# Patient Record
Sex: FEMALE | Race: BLACK OR AFRICAN AMERICAN | NOT HISPANIC OR LATINO | Employment: UNEMPLOYED | URBAN - METROPOLITAN AREA
[De-identification: names, ages, dates, MRNs, and addresses within clinical notes are randomized per-mention and may not be internally consistent; named-entity substitution may affect disease eponyms.]

---

## 2022-01-12 ENCOUNTER — OFFICE VISIT (OUTPATIENT)
Dept: URGENT CARE | Facility: CLINIC | Age: 27
End: 2022-01-12
Payer: COMMERCIAL

## 2022-01-12 VITALS
OXYGEN SATURATION: 100 % | DIASTOLIC BLOOD PRESSURE: 87 MMHG | RESPIRATION RATE: 16 BRPM | TEMPERATURE: 97 F | SYSTOLIC BLOOD PRESSURE: 128 MMHG | HEART RATE: 84 BPM

## 2022-01-12 DIAGNOSIS — R10.9 ABDOMINAL PAIN, UNSPECIFIED ABDOMINAL LOCATION: ICD-10-CM

## 2022-01-12 DIAGNOSIS — Z32.01 POSITIVE PREGNANCY TEST: Primary | ICD-10-CM

## 2022-01-12 DIAGNOSIS — Z76.89 ENCOUNTER TO ESTABLISH CARE: ICD-10-CM

## 2022-01-12 LAB
B-HCG UR QL: POSITIVE
BILIRUB UR QL STRIP: NEGATIVE
CTP QC/QA: YES
CTP QC/QA: YES
GLUCOSE UR QL STRIP: NEGATIVE
KETONES UR QL STRIP: NEGATIVE
LEUKOCYTE ESTERASE UR QL STRIP: NEGATIVE
PH, POC UA: 6.5 (ref 5–8)
POC BLOOD, URINE: NEGATIVE
POC NITRATES, URINE: NEGATIVE
PROT UR QL STRIP: NEGATIVE
SARS-COV-2 RDRP RESP QL NAA+PROBE: NEGATIVE
SP GR UR STRIP: 1.02 (ref 1–1.03)
UROBILINOGEN UR STRIP-ACNC: NORMAL (ref 0.1–1.1)

## 2022-01-12 PROCEDURE — 1159F MED LIST DOCD IN RCRD: CPT | Mod: CPTII,S$GLB,,

## 2022-01-12 PROCEDURE — 81025 URINE PREGNANCY TEST: CPT | Mod: S$GLB,,,

## 2022-01-12 PROCEDURE — 3079F PR MOST RECENT DIASTOLIC BLOOD PRESSURE 80-89 MM HG: ICD-10-PCS | Mod: CPTII,S$GLB,,

## 2022-01-12 PROCEDURE — 3074F PR MOST RECENT SYSTOLIC BLOOD PRESSURE < 130 MM HG: ICD-10-PCS | Mod: CPTII,S$GLB,,

## 2022-01-12 PROCEDURE — 81025 POCT URINE PREGNANCY: ICD-10-PCS | Mod: S$GLB,,,

## 2022-01-12 PROCEDURE — 3074F SYST BP LT 130 MM HG: CPT | Mod: CPTII,S$GLB,,

## 2022-01-12 PROCEDURE — 99213 PR OFFICE/OUTPT VISIT, EST, LEVL III, 20-29 MIN: ICD-10-PCS | Mod: 25,S$GLB,,

## 2022-01-12 PROCEDURE — 81003 URINALYSIS AUTO W/O SCOPE: CPT | Mod: QW,S$GLB,,

## 2022-01-12 PROCEDURE — 1159F PR MEDICATION LIST DOCUMENTED IN MEDICAL RECORD: ICD-10-PCS | Mod: CPTII,S$GLB,,

## 2022-01-12 PROCEDURE — U0002: ICD-10-PCS | Mod: QW,S$GLB,,

## 2022-01-12 PROCEDURE — 3079F DIAST BP 80-89 MM HG: CPT | Mod: CPTII,S$GLB,,

## 2022-01-12 PROCEDURE — 99213 OFFICE O/P EST LOW 20 MIN: CPT | Mod: 25,S$GLB,,

## 2022-01-12 PROCEDURE — 1160F PR REVIEW ALL MEDS BY PRESCRIBER/CLIN PHARMACIST DOCUMENTED: ICD-10-PCS | Mod: CPTII,S$GLB,,

## 2022-01-12 PROCEDURE — 81003 POCT URINALYSIS, DIPSTICK, AUTOMATED, W/O SCOPE: ICD-10-PCS | Mod: QW,S$GLB,,

## 2022-01-12 PROCEDURE — 1160F RVW MEDS BY RX/DR IN RCRD: CPT | Mod: CPTII,S$GLB,,

## 2022-01-12 PROCEDURE — U0002 COVID-19 LAB TEST NON-CDC: HCPCS | Mod: QW,S$GLB,,

## 2022-01-12 NOTE — PATIENT INSTRUCTIONS
- Rest.    - Drink plenty of fluids.    - Acetaminophen (tylenol) as directed as needed for pain. Avoid tylenol if you have a history of liver disease.  -Take prenatal vitamin as prescribed  Please follow-up with specialist regarding your positive pregnancy test today.  Please start your prenatal vitamin and continue taking Tylenol as needed for pain.  If you start developing worsening abdominal pain, nausea, vomiting, vaginal bleeding, please go to the emergency room immediately.    General Referral to [facility]   You were referred to [facility] for follow-up care on your condition.    Please call [phone] to schedule your appointment.    Please go to the Emergency Department for any concerns or worsening of condition.  Please follow up with your primary care doctor or specialist in the next 48-72hrs as needed.    If you  smoke, please stop smoking.    - You must understand that you have received an Urgent Care treatment only and that you may be released before all of your medical problems are known or treated.   - You, the patient, will arrange for follow up care as instructed.   - If your condition worsens or fails to improve we recommend that you receive another evaluation at the ER immediately or contact your PCP to discuss your concerns or return here.   - Follow up with your PCP or specialty clinic as directed in the next 1-2 weeks if not improved or as needed.  You can call [phone] to schedule an appointment with the appropriate provider.      Patient Education       Ectopic Pregnancy   The Basics   Written by the doctors and editors at [facility]   What is an ectopic pregnancy? -- This is the term doctors use for a pregnancy that is not located in the uterus, where a pregnancy usually is. It is located somewhere else in the body. An ectopic pregnancy is a serious condition and can even be life-threatening.  Pregnancy begins when an egg joins together with a sperm. These cells grow  "into a bigger group of cells, called an "embryo." In a normal pregnancy, the embryo attaches to the lining of the uterus (figure 1). Then, it can grow into a baby (figure 2).  In an ectopic pregnancy, the embryo does not attach to the lining of the uterus. Instead, it attaches to a place in the body that it should not attach to and starts to grow. Even though the embryo gets bigger, it cannot grow into a baby. As the embryo gets bigger, it can cause pain and bleeding and lead to other problems. Some of these problems can be life-threatening.  In most ectopic pregnancies, the embryo attaches to the lining of 1 of the fallopian tubes (the tubes that connect the ovaries to the uterus). When this happens, doctors also call it a "tubal pregnancy." In rare cases, the embryo can also attach to the cervix, ovary, or the inside of the belly.  Some people have a higher chance than others of having an ectopic pregnancy. You can have a higher chance of having an ectopic pregnancy if you:  · Have abnormal or damaged fallopian tubes, such as from past infections or surgeries  · Have had an ectopic pregnancy before  · Are getting certain treatments to help you get pregnant  · Smoke cigarettes  People who use a type of birth control called an "intrauterine device" or "IUD" have a very low chance of getting pregnant at all. But, if a person using an IUD does get pregnant, they are at higher risk of ectopic pregnancy. If you have an IUD and you think you are pregnant, tell your doctor or nurse right away. They can check for ectopic pregnancy.  What are the symptoms of an ectopic pregnancy? -- People who have an ectopic pregnancy don't always have symptoms early on. When early symptoms do happen, they can include:  · Lower belly pain  · Bleeding from the vagina (the bleeding can be heavy or light, or it can even be just spots of blood or brown staining)  Some people don't have any symptoms until the ectopic pregnancy causes more " "serious problems. For example, an embryo growing in a fallopian tube can cause the fallopian tube to burst open or "rupture." When this happens, symptoms can include:  · Severe lower belly pain  · Heavy bleeding from the vagina  · Fainting or passing out, or feeling like you might faint or pass out  If you are pregnant and have any of the symptoms listed above, go to an emergency room as soon as possible.  Is there a test for an ectopic pregnancy? -- To check if you have an ectopic pregnancy, your doctor or nurse can do:  · A blood test to measure a hormone called hCG - This test checks if you are pregnant and how much hCG the pregnancy is making.  · An imaging test called an ultrasound to see where the embryo is in your body - Imaging tests create pictures of the inside of the body.  Sometimes, test results show an ectopic pregnancy right away. But most of the time, doctors need to repeat the tests every few days to know for sure if you have an ectopic pregnancy.  How is an ectopic pregnancy treated? -- Doctors can treat an ectopic pregnancy in 2 ways, depending on the size of the embryo, your symptoms, and other factors. Both treatments involve removing the embryo. It is important to do this, because if the embryo continues to grow, it could cause the fallopian tube to burst (rupture). If this happens, it can be life threatening.  Options for treatment include:  · Medicine - Your doctor can give you a shot of a medicine called methotrexate. This medicine stops the embryo's growth and causes it to dissolve. If you are treated with medicine, you will need follow-up blood tests for a few weeks to make sure that the treatment worked.  · Surgery - A doctor can do surgery to remove the embryo. Your doctor might or might not need to remove your fallopian tube, too.  Can an ectopic pregnancy be prevented? -- Most ectopic pregnancies cannot be prevented. If you are planning to try to get pregnant, there is nothing specific " "you can do to prevent ectopic pregnancy.  The chance of having an ectopic pregnancy is higher in people who have had certain infections that are spread through sex ("sexually transmitted infections").  Can I get pregnant in the future? -- Most people are able to have a normal pregnancy after having an ectopic pregnancy. But let your doctor or nurse know if you are trying to get pregnant. That way, they can follow your pregnancy to make sure everything is normal.  All topics are updated as new evidence becomes available and our peer review process is complete.  This topic retrieved from [employer] on: Sep 21, 2021.  Topic 00285 Version 10.0  Release: 29.4.2 - C29.263  © 2021 UpToDate, Inc. and/or its affiliates. All rights reserved.  figure 1: Female reproductive anatomy     These are the internal organs that make up the female reproductive system.  Graphic 41711 Version 6.0    figure 2: How pregnancy happens     When pregnancy happens through sex, the following steps must happen:  1. An egg is released from the ovary. This is called "ovulation."  2. The partner's sperm swim up the vagina, into the uterus, and up the fallopian tubes. (These are the tubes that connect the ovaries to the uterus.)  3. When the sperm reach the egg, at least one sperm must get through the outer casing of the egg and make it inside. This is called "fertilization."   4. The newly fertilized egg travels down to the uterus.   5. The egg secures itself to the wall of the uterus. This is called "implantation."  Graphic 33284 Version 5.0    Consumer Information Use and Disclaimer   This information is not specific medical advice and does not replace information you receive from your health care provider. This is only a brief summary of general information. It does NOT include all information about conditions, illnesses, injuries, tests, procedures, treatments, therapies, discharge instructions or life-style choices that may apply to you. You must " talk with your health care provider for complete information about your health and treatment options. This information should not be used to decide whether or not to accept your health care provider's advice, instructions or recommendations. Only your health care provider has the knowledge and training to provide advice that is right for you. The use of this information is governed by the [employer] End User License Agreement, available at https://www.Eldarion/en/solutions/SocialVolt/about/nahomy.The use of [employer] content is governed by the [employer] Terms of Use. ©2021 UpToDate, Inc. All rights reserved.  Copyright   © 2021 UpToDate, Inc. and/or its affiliates. All rights reserved.

## 2022-01-12 NOTE — PROGRESS NOTES
Subjective:       Patient ID: Karlene Sweeney is a 26 y.o. female.    Vitals:  temperature is 97.4 °F (36.3 °C). Her blood pressure is 128/87 and her pulse is 84. Her respiration is 16 and oxygen saturation is 100%.     Chief Complaint: Sinus Problem and Abdominal Pain    Pt presents today with a chief complaint of lower abdominal pain. Pt mentions her symptoms started 3 day ago and have gradually gotten worse. Pt mentions taking tylenol to help alleviate her symptoms.     Provider note starts below:  Patient presents with lower bilateral abdominal pain that she states started 3 days ago.  She 1st noticed it coming on gradually and says it comes and goes.  She describes it as an aching pain that ranges from 4 to 5 out of 10 in severity.  She cannot think of anything that makes it worse or better.  It is not worsened with food or with physician.  She denies any changes in discharge, vaginal odor, dysuria, frequency, back pain, fever, chills.  Her last sexual intercourse was approximately 3 weeks ago with her boyfriend.  She is not currently on birth control.  She discontinued her birth control approximately 1 month ago.  Her last menstrual period was approximately 1 month ago around 11th or 13th of December.  She states she does not have a history of STD.  No past surgeries she reports. She state she has history of three miscarriaiges in the past. Of note, she states she has been constipated and has not had a normal bowel movement in several days.  She denies any nausea or vomiting.  She has been taking Tylenol for her pain.    Sinus Problem  This is a new problem. The current episode started in the past 7 days. The problem has been gradually worsening since onset. There has been no fever. Her pain is at a severity of 2/10. The pain is mild. Pertinent negatives include no chills, congestion, coughing, diaphoresis, ear pain, headaches, hoarse voice, neck pain, shortness of breath, sinus pressure, sneezing, sore throat or  swollen glands. Past treatments include acetaminophen. The treatment provided no relief.   Abdominal Pain  Associated symptoms include constipation. Pertinent negatives include no diarrhea, dysuria, fever, frequency, headaches, hematuria, nausea or vomiting. There is no history of abdominal surgery.       Constitution: Negative for chills, sweating and fever.   HENT: Negative for ear pain, congestion, sinus pressure and sore throat.    Neck: Negative for neck pain.   Cardiovascular: Negative for chest pain.   Respiratory: Negative for cough and shortness of breath.    Gastrointestinal: Positive for abdominal pain and constipation. Negative for abdominal trauma, history of abdominal surgery, nausea, vomiting and diarrhea.   Genitourinary: Negative for dysuria, frequency, urgency, flank pain, hematuria, vaginal pain, vaginal discharge, vaginal bleeding and vaginal odor.   Musculoskeletal: Negative for trauma.   Allergic/Immunologic: Negative for sneezing.   Neurological: Negative for headaches.       Objective:      Physical Exam   Constitutional: She is oriented to person, place, and time. She appears well-developed and well-nourished.   HENT:   Head: Normocephalic and atraumatic.   Ears:   Right Ear: External ear normal.   Left Ear: External ear normal.   Nose: Nose normal.   Mouth/Throat: Mucous membranes are normal.   Eyes: Conjunctivae and lids are normal.   Neck: Trachea normal. Neck supple.   Cardiovascular: Normal rate, regular rhythm, S1 normal, S2 normal, normal heart sounds and normal pulses.   Pulmonary/Chest: Effort normal and breath sounds normal. No respiratory distress. She has no wheezes. She has no rales.   Abdominal: Normal appearance and bowel sounds are normal. She exhibits no distension, no abdominal bruit, no pulsatile midline mass and no mass. Soft. There is abdominal tenderness in the suprapubic area, left upper quadrant and left lower quadrant. There is no rebound, no guarding, no tenderness  at McBurney's point, negative Sheehan's sign, no left CVA tenderness, negative Rovsing's sign and no right CVA tenderness.   Musculoskeletal: Normal range of motion.         General: No edema. Normal range of motion.   Neurological: She is alert and oriented to person, place, and time. She has normal strength.   Skin: Skin is warm, dry, intact, not diaphoretic and not pale.   Psychiatric: She has a normal mood and affect. Her speech is normal and behavior is normal. Judgment and thought content normal. Cognition and memory  Nursing note and vitals reviewed.    Results for orders placed or performed in visit on 22   POCT COVID-19 Rapid Screening   Result Value Ref Range    POC Rapid COVID Negative Negative     Acceptable Yes    POCT Urinalysis, Dipstick, Automated, W/O Scope   Result Value Ref Range    POC Blood, Urine Negative Negative    POC Bilirubin, Urine Negative Negative    POC Urobilinogen, Urine NORMAL 0.1 - 1.1    POC Ketones, Urine Negative Negative    POC Protein, Urine Negative Negative    POC Nitrates, Urine Negative Negative    POC Glucose, Urine Negative Negative    pH, UA 6.5 5 - 8    POC Specific Gravity, Urine 1.020 1.003 - 1.029    POC Leukocytes, Urine Negative Negative   POCT urine pregnancy   Result Value Ref Range    POC Preg Test, Ur Positive (A) Negative     Acceptable Yes            Assessment:       1. Positive pregnancy test    2. Abdominal pain, unspecified abdominal location    3. Encounter to establish care          Plan:     Labs reviewed.  Discussed over-the-counter treatment for symptom control as discussed below.  Discussed positive pregnancy results with patient.  Informed patient that pain could be due to uterine ligament stretching due to recent pregnancy or possible ectopic pregnancy or spontaneous .  Informed patient that above could not be ruled out as we do not have appropriate imaging or blood work.  Risks and worsening symptoms  associated with above discussed with patient.  Shared decision making with patient regarding if she should go to the emergency room or not.  Patient states that she wanted to follow-up with OBGYN and would go to the emergency room if her symptoms worsened.  Patient agreed to this treatment plan.  Patient discharged in stable condition.  Case discussed with Dr. Adrien Trevino who agreed with plan.    Positive pregnancy test  -     prenatal vit-iron fum-folic ac 27 mg iron- 0.8 mg Tab; Take 1 tablet by mouth once daily.  Dispense: 30 tablet; Refill: 11  -     Ambulatory referral/consult to Obstetrics / Gynecology    Abdominal pain, unspecified abdominal location  -     POCT COVID-19 Rapid Screening  -     POCT Urinalysis, Dipstick, Automated, W/O Scope  -     POCT urine pregnancy  -     Ambulatory referral/consult to Obstetrics / Gynecology    Encounter to establish care  -     Ambulatory referral/consult to Obstetrics / Gynecology    Patient Instructions   - Rest.    - Drink plenty of fluids.    - Acetaminophen (tylenol) as directed as needed for pain. Avoid tylenol if you have a history of liver disease.  -Take prenatal vitamin as prescribed  Please follow-up with specialist regarding your positive pregnancy test today.  Please start your prenatal vitamin and continue taking Tylenol as needed for pain.  If you start developing worsening abdominal pain, nausea, vomiting, vaginal bleeding, please go to the emergency room immediately.    General Referral to [facility]   You were referred to [facility] for follow-up care on your condition.    Please call [phone] to schedule your appointment.    Please go to the Emergency Department for any concerns or worsening of condition.  Please follow up with your primary care doctor or specialist in the next 48-72hrs as needed.    If you  smoke, please stop smoking.    - You must understand that you have received an Urgent Care treatment only and that you may be  "released before all of your medical problems are known or treated.   - You, the patient, will arrange for follow up care as instructed.   - If your condition worsens or fails to improve we recommend that you receive another evaluation at the ER immediately or contact your PCP to discuss your concerns or return here.   - Follow up with your PCP or specialty clinic as directed in the next 1-2 weeks if not improved or as needed.  You can call [phone] to schedule an appointment with the appropriate provider.      Patient Education       Ectopic Pregnancy   The Basics   Written by the doctors and editors at [facility]   What is an ectopic pregnancy? -- This is the term doctors use for a pregnancy that is not located in the uterus, where a pregnancy usually is. It is located somewhere else in the body. An ectopic pregnancy is a serious condition and can even be life-threatening.  Pregnancy begins when an egg joins together with a sperm. These cells grow into a bigger group of cells, called an "embryo." In a normal pregnancy, the embryo attaches to the lining of the uterus (figure 1). Then, it can grow into a baby (figure 2).  In an ectopic pregnancy, the embryo does not attach to the lining of the uterus. Instead, it attaches to a place in the body that it should not attach to and starts to grow. Even though the embryo gets bigger, it cannot grow into a baby. As the embryo gets bigger, it can cause pain and bleeding and lead to other problems. Some of these problems can be life-threatening.  In most ectopic pregnancies, the embryo attaches to the lining of 1 of the fallopian tubes (the tubes that connect the ovaries to the uterus). When this happens, doctors also call it a "tubal pregnancy." In rare cases, the embryo can also attach to the cervix, ovary, or the inside of the belly.  Some people have a higher chance than others of having an ectopic pregnancy. You can have a higher chance of having an ectopic pregnancy " "if you:  · Have abnormal or damaged fallopian tubes, such as from past infections or surgeries  · Have had an ectopic pregnancy before  · Are getting certain treatments to help you get pregnant  · Smoke cigarettes  People who use a type of birth control called an "intrauterine device" or "IUD" have a very low chance of getting pregnant at all. But, if a person using an IUD does get pregnant, they are at higher risk of ectopic pregnancy. If you have an IUD and you think you are pregnant, tell your doctor or nurse right away. They can check for ectopic pregnancy.  What are the symptoms of an ectopic pregnancy? -- People who have an ectopic pregnancy don't always have symptoms early on. When early symptoms do happen, they can include:  · Lower belly pain  · Bleeding from the vagina (the bleeding can be heavy or light, or it can even be just spots of blood or brown staining)  Some people don't have any symptoms until the ectopic pregnancy causes more serious problems. For example, an embryo growing in a fallopian tube can cause the fallopian tube to burst open or "rupture." When this happens, symptoms can include:  · Severe lower belly pain  · Heavy bleeding from the vagina  · Fainting or passing out, or feeling like you might faint or pass out  If you are pregnant and have any of the symptoms listed above, go to an emergency room as soon as possible.  Is there a test for an ectopic pregnancy? -- To check if you have an ectopic pregnancy, your doctor or nurse can do:  · A blood test to measure a hormone called hCG - This test checks if you are pregnant and how much hCG the pregnancy is making.  · An imaging test called an ultrasound to see where the embryo is in your body - Imaging tests create pictures of the inside of the body.  Sometimes, test results show an ectopic pregnancy right away. But most of the time, doctors need to repeat the tests every few days to know for sure if you have an ectopic pregnancy.  How is " "an ectopic pregnancy treated? -- Doctors can treat an ectopic pregnancy in 2 ways, depending on the size of the embryo, your symptoms, and other factors. Both treatments involve removing the embryo. It is important to do this, because if the embryo continues to grow, it could cause the fallopian tube to burst (rupture). If this happens, it can be life threatening.  Options for treatment include:  · Medicine - Your doctor can give you a shot of a medicine called methotrexate. This medicine stops the embryo's growth and causes it to dissolve. If you are treated with medicine, you will need follow-up blood tests for a few weeks to make sure that the treatment worked.  · Surgery - A doctor can do surgery to remove the embryo. Your doctor might or might not need to remove your fallopian tube, too.  Can an ectopic pregnancy be prevented? -- Most ectopic pregnancies cannot be prevented. If you are planning to try to get pregnant, there is nothing specific you can do to prevent ectopic pregnancy.  The chance of having an ectopic pregnancy is higher in people who have had certain infections that are spread through sex ("sexually transmitted infections").  Can I get pregnant in the future? -- Most people are able to have a normal pregnancy after having an ectopic pregnancy. But let your doctor or nurse know if you are trying to get pregnant. That way, they can follow your pregnancy to make sure everything is normal.  All topics are updated as new evidence becomes available and our peer review process is complete.  This topic retrieved from [employer] on: Sep 21, 2021.  Topic 84490 Version 10.0  Release: 29.4.2 - C29.263  © 2021 UpToDate, Inc. and/or its affiliates. All rights reserved.  figure 1: Female reproductive anatomy     These are the internal organs that make up the female reproductive system.  Graphic 38419 Version 6.0    figure 2: How pregnancy happens     When pregnancy happens through sex, the following steps " "must happen:  1. An egg is released from the ovary. This is called "ovulation."  2. The partner's sperm swim up the vagina, into the uterus, and up the fallopian tubes. (These are the tubes that connect the ovaries to the uterus.)  3. When the sperm reach the egg, at least one sperm must get through the outer casing of the egg and make it inside. This is called "fertilization."   4. The newly fertilized egg travels down to the uterus.   5. The egg secures itself to the wall of the uterus. This is called "implantation."  Graphic 07009 Version 5.0    Consumer Information Use and Disclaimer   This information is not specific medical advice and does not replace information you receive from your health care provider. This is only a brief summary of general information. It does NOT include all information about conditions, illnesses, injuries, tests, procedures, treatments, therapies, discharge instructions or life-style choices that may apply to you. You must talk with your health care provider for complete information about your health and treatment options. This information should not be used to decide whether or not to accept your health care provider's advice, instructions or recommendations. Only your health care provider has the knowledge and training to provide advice that is right for you. The use of this information is governed by the [employer] End User License Agreement, available at https://[email or web address].Surveypal/en/solutions/OneWheel/about/nahomy.The use of [employer] content is governed by the [employer] Terms of Use. ©2021 UpToDate, Inc. All rights reserved.  Copyright   © 2021 UpToDate, Inc. and/or its affiliates. All rights reserved.                     "

## 2022-01-13 ENCOUNTER — OFFICE VISIT (OUTPATIENT)
Dept: OBSTETRICS AND GYNECOLOGY | Facility: CLINIC | Age: 27
End: 2022-01-13
Payer: COMMERCIAL

## 2022-01-13 ENCOUNTER — LAB VISIT (OUTPATIENT)
Dept: LAB | Facility: HOSPITAL | Age: 27
End: 2022-01-13
Attending: OBSTETRICS & GYNECOLOGY
Payer: COMMERCIAL

## 2022-01-13 VITALS
HEIGHT: 62 IN | DIASTOLIC BLOOD PRESSURE: 90 MMHG | WEIGHT: 128.06 LBS | SYSTOLIC BLOOD PRESSURE: 130 MMHG | BODY MASS INDEX: 23.57 KG/M2

## 2022-01-13 DIAGNOSIS — N91.2 AMENORRHEA: ICD-10-CM

## 2022-01-13 DIAGNOSIS — N91.2 AMENORRHEA: Primary | ICD-10-CM

## 2022-01-13 LAB — HCG INTACT+B SERPL-ACNC: 104 MIU/ML

## 2022-01-13 PROCEDURE — 3075F SYST BP GE 130 - 139MM HG: CPT | Mod: CPTII,S$GLB,, | Performed by: OBSTETRICS & GYNECOLOGY

## 2022-01-13 PROCEDURE — 3008F PR BODY MASS INDEX (BMI) DOCUMENTED: ICD-10-PCS | Mod: CPTII,S$GLB,, | Performed by: OBSTETRICS & GYNECOLOGY

## 2022-01-13 PROCEDURE — 1160F PR REVIEW ALL MEDS BY PRESCRIBER/CLIN PHARMACIST DOCUMENTED: ICD-10-PCS | Mod: CPTII,S$GLB,, | Performed by: OBSTETRICS & GYNECOLOGY

## 2022-01-13 PROCEDURE — 99203 OFFICE O/P NEW LOW 30 MIN: CPT | Mod: S$GLB,,, | Performed by: OBSTETRICS & GYNECOLOGY

## 2022-01-13 PROCEDURE — 87086 URINE CULTURE/COLONY COUNT: CPT | Performed by: OBSTETRICS & GYNECOLOGY

## 2022-01-13 PROCEDURE — 1159F MED LIST DOCD IN RCRD: CPT | Mod: CPTII,S$GLB,, | Performed by: OBSTETRICS & GYNECOLOGY

## 2022-01-13 PROCEDURE — 99203 PR OFFICE/OUTPT VISIT, NEW, LEVL III, 30-44 MIN: ICD-10-PCS | Mod: S$GLB,,, | Performed by: OBSTETRICS & GYNECOLOGY

## 2022-01-13 PROCEDURE — 1160F RVW MEDS BY RX/DR IN RCRD: CPT | Mod: CPTII,S$GLB,, | Performed by: OBSTETRICS & GYNECOLOGY

## 2022-01-13 PROCEDURE — 86901 BLOOD TYPING SEROLOGIC RH(D): CPT | Performed by: OBSTETRICS & GYNECOLOGY

## 2022-01-13 PROCEDURE — 3008F BODY MASS INDEX DOCD: CPT | Mod: CPTII,S$GLB,, | Performed by: OBSTETRICS & GYNECOLOGY

## 2022-01-13 PROCEDURE — 3080F PR MOST RECENT DIASTOLIC BLOOD PRESSURE >= 90 MM HG: ICD-10-PCS | Mod: CPTII,S$GLB,, | Performed by: OBSTETRICS & GYNECOLOGY

## 2022-01-13 PROCEDURE — 1159F PR MEDICATION LIST DOCUMENTED IN MEDICAL RECORD: ICD-10-PCS | Mod: CPTII,S$GLB,, | Performed by: OBSTETRICS & GYNECOLOGY

## 2022-01-13 PROCEDURE — 3075F PR MOST RECENT SYSTOLIC BLOOD PRESS GE 130-139MM HG: ICD-10-PCS | Mod: CPTII,S$GLB,, | Performed by: OBSTETRICS & GYNECOLOGY

## 2022-01-13 PROCEDURE — 99999 PR PBB SHADOW E&M-EST. PATIENT-LVL III: ICD-10-PCS | Mod: PBBFAC,,, | Performed by: OBSTETRICS & GYNECOLOGY

## 2022-01-13 PROCEDURE — 99999 PR PBB SHADOW E&M-EST. PATIENT-LVL III: CPT | Mod: PBBFAC,,, | Performed by: OBSTETRICS & GYNECOLOGY

## 2022-01-13 PROCEDURE — 3080F DIAST BP >= 90 MM HG: CPT | Mod: CPTII,S$GLB,, | Performed by: OBSTETRICS & GYNECOLOGY

## 2022-01-13 PROCEDURE — 84702 CHORIONIC GONADOTROPIN TEST: CPT | Performed by: OBSTETRICS & GYNECOLOGY

## 2022-01-13 NOTE — ASSESSMENT & PLAN NOTE
Possible early pregnancy with +UPT at urgent care yesterday and in clinic today, no hCG or US done. LMP puts her~4 weeks gestation. Will obtain serial hCG levels, and if rising appropriately will schedule for US around 8 weeks and INOB around 10-12 weeks. SAB, ectopic, pain, and bleeding precautions reviewed. All questions answered to the patient's apparent satisfaction.

## 2022-01-13 NOTE — PROGRESS NOTES
Chief Complaint   Patient presents with    Absent Menses       HPI:   26 y.o.  here today for follow up of abdominal pain, recently seen at urgent care with +UPT. LMP -, patient unsure of exact date. She has been having unprotected sex with her boyfriend and took plan B three times between -1/3. Has a history of 3 early first trimester losses. Denies cramping, pain, or bleeding today. She took a laxative yesterday and had a bowel movement with relief of the pain. She denies major medical problems and is not taking any medications. She is with a different partner than her three prior losses. She denies history of D&C, tissue passed spontaneously. Is okay with proceeding with pregnancy if this is a viable IUP. Denies a history of blood clots, easy bruising, nosebleeds, or known autoimmune disease.     LMP Dates from Last 1 Encounters:   LMP: 2021       Labs / Significant Studies  Pap (2021): Done at [facility], result not available in Care Everywhere    Office Visit on 2022   Component Date Value Ref Range Status    POC Rapid COVID 2022 Negative  Negative Final     Acceptable 2022 Yes   Final    POC Blood, Urine 2022 Negative  Negative Final    POC Bilirubin, Urine 2022 Negative  Negative Final    POC Urobilinogen, Urine 2022 NORMAL  0.1 - 1.1 Final    POC Ketones, Urine 2022 Negative  Negative Final    POC Protein, Urine 2022 Negative  Negative Final    POC Nitrates, Urine 2022 Negative  Negative Final    POC Glucose, Urine 2022 Negative  Negative Final    pH, UA 2022 6.5  5 - 8 Final    POC Specific Gravity, Urine 2022 1.020  1.003 - 1.029 Final    POC Leukocytes, Urine 2022 Negative  Negative Final    POC Preg Test, Ur 2022 Positive* Negative Final     Acceptable 2022 Yes   Final        Past Medical History:   Diagnosis Date    Abnormal Pap smear of cervix       Past Surgical History:   Procedure Laterality Date    ELBOW SURGERY Left 2018    SPLENECTOMY, PARTIAL N/A 2018       Current Outpatient Medications:     prenatal vit-iron fum-folic ac 27 mg iron- 0.8 mg Tab, Take 1 tablet by mouth once daily., Disp: 30 tablet, Rfl: 11  Review of patient's allergies indicates:  No Known Allergies  OB History    Para Term  AB Living   4       3 0   SAB IAB Ectopic Multiple Live Births   3              # Outcome Date GA Lbr John/2nd Weight Sex Delivery Anes PTL Lv   4 Current            3 2020           2 2019             Social History     Tobacco Use    Smoking status: Never Smoker    Smokeless tobacco: Never Used   Substance Use Topics    Alcohol use: Yes    Drug use: Yes     Types: Marijuana     Family History   Problem Relation Age of Onset    Breast cancer Neg Hx     Colon cancer Neg Hx     Ovarian cancer Neg Hx        Review of Systems   Negative except as in HPI    Physical Exam   Vitals:    22 1507   BP: (!) 130/90     Body mass index is 23.42 kg/m².    Physical Exam  Constitutional:       General: She is not in acute distress.     Appearance: Normal appearance.   HENT:      Head: Normocephalic and atraumatic.   Pulmonary:      Effort: Pulmonary effort is normal.   Musculoskeletal:         General: Normal range of motion.      Cervical back: Normal range of motion.   Neurological:      General: No focal deficit present.      Mental Status: She is alert and oriented to person, place, and time.   Skin:     General: Skin is warm and dry.   Psychiatric:         Mood and Affect: Mood normal.         Behavior: Behavior normal.         Thought Content: Thought content normal.          Labs reviewed: UPT, UA, COVID screening,     ASSESSMENT:   Patient Active Problem List   Diagnosis    Amenorrhea       PLAN:  Problem List Items Addressed This Visit        Renal/    Amenorrhea - Primary    Current Assessment & Plan      Possible early pregnancy with +UPT at urgent care yesterday and in clinic today, no hCG or US done. LMP puts her~4 weeks gestation. Will obtain serial hCG levels, and if rising appropriately will schedule for US around 8 weeks and INOB around 10-12 weeks. SAB, ectopic, pain, and bleeding precautions reviewed. All questions answered to the patient's apparent satisfaction.          Relevant Orders    Type & Screen (Completed)    Urine culture    HCG, Quantitative (Completed)    HCG, Quantitative           Total time spent on this encounter was 30 minutes.  This includes preparing to see the patient;  obtaining/reviewing separately obtained history;  performing a medical exam and/or evaluation;   counseling/educating the patient;  ordering medications, tests, of procedures;   referring/communicating with other health care professionals;  EMR documentation;  interpreting/communicating results to the patient;  and/or care coordination.        Jodi Sheehan MD  Department of Obstetrics & Gynecology  [facility]

## 2022-01-14 ENCOUNTER — PATIENT MESSAGE (OUTPATIENT)
Dept: OBSTETRICS AND GYNECOLOGY | Facility: CLINIC | Age: 27
End: 2022-01-14
Payer: COMMERCIAL

## 2022-01-14 DIAGNOSIS — N91.2 AMENORRHEA: Primary | ICD-10-CM

## 2022-01-14 LAB
ABO + RH BLD: NORMAL
BACTERIA UR CULT: NO GROWTH
BLD GP AB SCN CELLS X3 SERPL QL: NORMAL

## 2022-01-15 ENCOUNTER — LAB VISIT (OUTPATIENT)
Dept: LAB | Facility: HOSPITAL | Age: 27
End: 2022-01-15
Attending: OBSTETRICS & GYNECOLOGY
Payer: COMMERCIAL

## 2022-01-15 DIAGNOSIS — N91.2 AMENORRHEA: ICD-10-CM

## 2022-01-15 LAB — HCG INTACT+B SERPL-ACNC: 266 MIU/ML

## 2022-01-15 PROCEDURE — 84702 CHORIONIC GONADOTROPIN TEST: CPT | Performed by: OBSTETRICS & GYNECOLOGY

## 2022-01-15 PROCEDURE — 36415 COLL VENOUS BLD VENIPUNCTURE: CPT | Performed by: OBSTETRICS & GYNECOLOGY

## 2022-01-18 ENCOUNTER — LAB VISIT (OUTPATIENT)
Dept: LAB | Facility: HOSPITAL | Age: 27
End: 2022-01-18
Attending: OBSTETRICS & GYNECOLOGY
Payer: COMMERCIAL

## 2022-01-18 DIAGNOSIS — N91.2 AMENORRHEA: ICD-10-CM

## 2022-01-18 LAB — HCG INTACT+B SERPL-ACNC: 749 MIU/ML

## 2022-01-18 PROCEDURE — 36415 COLL VENOUS BLD VENIPUNCTURE: CPT | Performed by: OBSTETRICS & GYNECOLOGY

## 2022-01-18 PROCEDURE — 84702 CHORIONIC GONADOTROPIN TEST: CPT | Performed by: OBSTETRICS & GYNECOLOGY

## 2022-01-19 ENCOUNTER — PATIENT MESSAGE (OUTPATIENT)
Dept: OBSTETRICS AND GYNECOLOGY | Facility: CLINIC | Age: 27
End: 2022-01-19
Payer: COMMERCIAL

## 2022-01-19 ENCOUNTER — HOSPITAL ENCOUNTER (EMERGENCY)
Facility: OTHER | Age: 27
Discharge: HOME | End: 2022-01-19
Attending: EMERGENCY MEDICINE
Payer: COMMERCIAL

## 2022-01-19 VITALS
HEART RATE: 86 BPM | DIASTOLIC BLOOD PRESSURE: 81 MMHG | BODY MASS INDEX: 23.19 KG/M2 | TEMPERATURE: 99 F | WEIGHT: 126 LBS | OXYGEN SATURATION: 98 % | SYSTOLIC BLOOD PRESSURE: 117 MMHG | RESPIRATION RATE: 18 BRPM | HEIGHT: 62 IN

## 2022-01-19 DIAGNOSIS — N91.2 AMENORRHEA: Primary | ICD-10-CM

## 2022-01-19 DIAGNOSIS — O20.9 VAGINAL BLEEDING AFFECTING EARLY PREGNANCY: ICD-10-CM

## 2022-01-19 DIAGNOSIS — O20.0 THREATENED ABORTION: Primary | ICD-10-CM

## 2022-01-19 LAB
ALBUMIN SERPL BCP-MCNC: 4.3 G/DL (ref 3.5–5.2)
ALP SERPL-CCNC: 78 U/L (ref 55–135)
ALT SERPL W/O P-5'-P-CCNC: 18 U/L (ref 10–44)
ANION GAP SERPL CALC-SCNC: 9 MMOL/L (ref 8–16)
AST SERPL-CCNC: 20 U/L (ref 10–40)
BACTERIA GENITAL QL WET PREP: ABNORMAL
BASOPHILS # BLD AUTO: 0.04 K/UL (ref 0–0.2)
BASOPHILS NFR BLD: 0.7 % (ref 0–1.9)
BILIRUB SERPL-MCNC: 0.4 MG/DL (ref 0.1–1)
BUN SERPL-MCNC: 9 MG/DL (ref 6–20)
CALCIUM SERPL-MCNC: 10 MG/DL (ref 8.7–10.5)
CHLORIDE SERPL-SCNC: 105 MMOL/L (ref 95–110)
CLUE CELLS VAG QL WET PREP: ABNORMAL
CO2 SERPL-SCNC: 24 MMOL/L (ref 23–29)
CREAT SERPL-MCNC: 0.8 MG/DL (ref 0.5–1.4)
DIFFERENTIAL METHOD: ABNORMAL
EOSINOPHIL # BLD AUTO: 0.2 K/UL (ref 0–0.5)
EOSINOPHIL NFR BLD: 3.7 % (ref 0–8)
ERYTHROCYTE [DISTWIDTH] IN BLOOD BY AUTOMATED COUNT: 11.3 % (ref 11.5–14.5)
EST. GFR  (AFRICAN AMERICAN): >60 ML/MIN/1.73 M^2
EST. GFR  (NON AFRICAN AMERICAN): >60 ML/MIN/1.73 M^2
FILAMENT FUNGI VAG WET PREP-#/AREA: ABNORMAL
GLUCOSE SERPL-MCNC: 85 MG/DL (ref 70–110)
HCG INTACT+B SERPL-ACNC: 940 MIU/ML
HCT VFR BLD AUTO: 41.9 % (ref 37–48.5)
HCV AB SERPL QL IA: NEGATIVE
HGB BLD-MCNC: 14 G/DL (ref 12–16)
HIV 1+2 AB+HIV1 P24 AG SERPL QL IA: NEGATIVE
IMM GRANULOCYTES # BLD AUTO: 0 K/UL (ref 0–0.04)
IMM GRANULOCYTES NFR BLD AUTO: 0 % (ref 0–0.5)
LYMPHOCYTES # BLD AUTO: 2.4 K/UL (ref 1–4.8)
LYMPHOCYTES NFR BLD: 43 % (ref 18–48)
MCH RBC QN AUTO: 29.5 PG (ref 27–31)
MCHC RBC AUTO-ENTMCNC: 33.4 G/DL (ref 32–36)
MCV RBC AUTO: 88 FL (ref 82–98)
MONOCYTES # BLD AUTO: 0.6 K/UL (ref 0.3–1)
MONOCYTES NFR BLD: 11.2 % (ref 4–15)
NEUTROPHILS # BLD AUTO: 2.3 K/UL (ref 1.8–7.7)
NEUTROPHILS NFR BLD: 41.4 % (ref 38–73)
NRBC BLD-RTO: 0 /100 WBC
PLATELET # BLD AUTO: 350 K/UL (ref 150–450)
PMV BLD AUTO: 8.7 FL (ref 9.2–12.9)
POTASSIUM SERPL-SCNC: 4.5 MMOL/L (ref 3.5–5.1)
PROT SERPL-MCNC: 7.7 G/DL (ref 6–8.4)
RBC # BLD AUTO: 4.75 M/UL (ref 4–5.4)
SODIUM SERPL-SCNC: 138 MMOL/L (ref 136–145)
SPECIMEN SOURCE: ABNORMAL
T VAGINALIS GENITAL QL WET PREP: ABNORMAL
WBC # BLD AUTO: 5.46 K/UL (ref 3.9–12.7)
WBC #/AREA VAG WET PREP: ABNORMAL
YEAST GENITAL QL WET PREP: ABNORMAL

## 2022-01-19 PROCEDURE — 80053 COMPREHEN METABOLIC PANEL: CPT | Performed by: PHYSICIAN ASSISTANT

## 2022-01-19 PROCEDURE — 84702 CHORIONIC GONADOTROPIN TEST: CPT | Performed by: PHYSICIAN ASSISTANT

## 2022-01-19 PROCEDURE — 87210 SMEAR WET MOUNT SALINE/INK: CPT | Performed by: PHYSICIAN ASSISTANT

## 2022-01-19 PROCEDURE — 87389 HIV-1 AG W/HIV-1&-2 AB AG IA: CPT | Performed by: EMERGENCY MEDICINE

## 2022-01-19 PROCEDURE — 87591 N.GONORRHOEAE DNA AMP PROB: CPT | Performed by: PHYSICIAN ASSISTANT

## 2022-01-19 PROCEDURE — 99284 EMERGENCY DEPT VISIT MOD MDM: CPT | Mod: 25

## 2022-01-19 PROCEDURE — 86803 HEPATITIS C AB TEST: CPT | Performed by: EMERGENCY MEDICINE

## 2022-01-19 PROCEDURE — 87491 CHLMYD TRACH DNA AMP PROBE: CPT | Performed by: PHYSICIAN ASSISTANT

## 2022-01-19 PROCEDURE — 85025 COMPLETE CBC W/AUTO DIFF WBC: CPT | Performed by: PHYSICIAN ASSISTANT

## 2022-01-19 NOTE — Clinical Note
"Karlene Masters (Jada)y was seen and treated in our emergency department on 1/19/2022.  She may return to work on 01/21/2022.       If you have any questions or concerns, please don't hesitate to call.      RADHA Frazier"

## 2022-01-20 ENCOUNTER — PATIENT MESSAGE (OUTPATIENT)
Dept: OBSTETRICS AND GYNECOLOGY | Facility: CLINIC | Age: 27
End: 2022-01-20

## 2022-01-20 ENCOUNTER — OFFICE VISIT (OUTPATIENT)
Dept: OBSTETRICS AND GYNECOLOGY | Facility: CLINIC | Age: 27
End: 2022-01-20
Payer: COMMERCIAL

## 2022-01-20 ENCOUNTER — LAB VISIT (OUTPATIENT)
Dept: LAB | Facility: HOSPITAL | Age: 27
End: 2022-01-20
Attending: OBSTETRICS & GYNECOLOGY
Payer: COMMERCIAL

## 2022-01-20 DIAGNOSIS — O36.80X0 PREGNANCY OF UNKNOWN ANATOMIC LOCATION: Primary | ICD-10-CM

## 2022-01-20 DIAGNOSIS — N91.2 AMENORRHEA: ICD-10-CM

## 2022-01-20 LAB — HCG INTACT+B SERPL-ACNC: 981 MIU/ML

## 2022-01-20 PROCEDURE — 1159F MED LIST DOCD IN RCRD: CPT | Mod: CPTII,95,, | Performed by: OBSTETRICS & GYNECOLOGY

## 2022-01-20 PROCEDURE — 1160F PR REVIEW ALL MEDS BY PRESCRIBER/CLIN PHARMACIST DOCUMENTED: ICD-10-PCS | Mod: CPTII,95,, | Performed by: OBSTETRICS & GYNECOLOGY

## 2022-01-20 PROCEDURE — 99213 OFFICE O/P EST LOW 20 MIN: CPT | Mod: 95,,, | Performed by: OBSTETRICS & GYNECOLOGY

## 2022-01-20 PROCEDURE — 1159F PR MEDICATION LIST DOCUMENTED IN MEDICAL RECORD: ICD-10-PCS | Mod: CPTII,95,, | Performed by: OBSTETRICS & GYNECOLOGY

## 2022-01-20 PROCEDURE — 99213 PR OFFICE/OUTPT VISIT, EST, LEVL III, 20-29 MIN: ICD-10-PCS | Mod: 95,,, | Performed by: OBSTETRICS & GYNECOLOGY

## 2022-01-20 PROCEDURE — 36415 COLL VENOUS BLD VENIPUNCTURE: CPT | Performed by: OBSTETRICS & GYNECOLOGY

## 2022-01-20 PROCEDURE — 84702 CHORIONIC GONADOTROPIN TEST: CPT | Performed by: OBSTETRICS & GYNECOLOGY

## 2022-01-20 PROCEDURE — 1160F RVW MEDS BY RX/DR IN RCRD: CPT | Mod: CPTII,95,, | Performed by: OBSTETRICS & GYNECOLOGY

## 2022-01-20 NOTE — PROGRESS NOTES
The patient location is: [facility]  The chief complaint leading to consultation is: Bleeding in early pregnancy  Visit type: audiovisual  Total time spent with patient: 15 minutes    Each patient to whom he or she provides medical services by telemedicine is:  (1) informed of the relationship between the physician and patient and the respective role of any other health care provider with respect to management of the patient; and (2) notified that he or she may decline to receive medical services by telemedicine and may withdraw from such care at any time.      Chief Complaint: Bleeding in early pregnancy     HPI:      Karlene Sweeney is a 26 y.o.  who presents via virtual visit to discuss bleeding in early pregnancy. Patient with light spotting yesterday that increased to bright red bleeding necessitating a pad. She denies cramping or pelvic pain. She was seen in the ED and TVUS showed an empty uterus and no evidence of ectopic. HCG was drawn but had previously been drawn the day prior, see trend below. Of note, the patient took 3 plan B pills around the time this pregnancy was likely conceived.     Patient has regular monthly menses. Patient's last menstrual period was 2021.      ROS:     GENERAL: Denies fevers or chills. Feeling well overall.   ABDOMEN: Denies abdominal pain, constipation, diarrhea, nausea, vomiting, change in appetite.   URINARY: Denies frequency, dysuria, hematuria.  GYNECOLOGIC: See HPI.  NEUROLOGIC: Denies syncope or weakness.     Physical Exam:      PHYSICAL EXAM:  LMP 2021   There is no height or weight on file to calculate BMI.     APPEARANCE: Well nourished, well developed, in no acute distress.  Exam deferred due to televisit    Results:      TVUS (22): No IUP or adnexal masses visualized. Findings may suggest SAB, ectopic, or early pregnancy.   HC () --> 266 (1/15) --> 749 () --> 940 ()    Assessment/Plan:     Problem List Items Addressed This  Visit        Obstetric    Pregnancy of unknown anatomic location - Primary    Current Assessment & Plan     Repeat hCG today. If increasing appropriately, likely normal early IUP. If decreasing, possible SAB, if inappropriate rise, possible ectopic pregnancy. Patient given strict pain and bleeding precautions. Will arrange follow up pending hCG values today (749 two days ago).                  Counseling:       Use of the [facility] Patient Portal discussed and encouraged during today's visit.         Jodi Sheehan MD  Department of Obstetrics & Gynecology  [facility]

## 2022-01-20 NOTE — ED PROVIDER NOTES
Encounter Date: 2022       History     Chief Complaint   Patient presents with    Vaginal Bleeding     Pt, who is approximately 6 weeks pregnant presents to the ER with complaints of vaginal bleeding that started this morning.     Afebrile 26 y.o. female who is  presents to the ED for evaluation of vaginal bleeding in early pregnancy.  Patient reports per last menstrual.  She is approximately 6 weeks gestation.  She is following with her Ob as she saw them early in pregnancy.  She has not had confirmed IUP.  She states that she began with some vaginal spotting this morning and that the spotting did increase to bright red bleeding.  Denies passing any tissue or clots.  Denies any significant blood loss.  Denies any associated cramping.  Denies any lightheadedness or palpitations.  Denies any additional complaints.  She has not tried any medications for symptoms.    The history is provided by the patient.     Review of patient's allergies indicates:  No Known Allergies  Past Medical History:   Diagnosis Date    Abnormal Pap smear of cervix      Past Surgical History:   Procedure Laterality Date    ELBOW SURGERY Left 2018    SPLENECTOMY, PARTIAL N/A 2018     Family History   Problem Relation Age of Onset    Breast cancer Neg Hx     Colon cancer Neg Hx     Ovarian cancer Neg Hx      Social History     Tobacco Use    Smoking status: Never Smoker    Smokeless tobacco: Never Used   Substance Use Topics    Alcohol use: Yes    Drug use: Yes     Types: Marijuana     Review of Systems   Constitutional: Negative for activity change, appetite change and fever.   HENT: Negative for sore throat.    Respiratory: Negative for shortness of breath.    Cardiovascular: Negative for chest pain.   Gastrointestinal: Negative for abdominal pain and nausea.   Genitourinary: Positive for vaginal bleeding. Negative for dysuria, pelvic pain and vaginal discharge.   Musculoskeletal: Negative for back pain.   Skin:  Negative for rash.   Neurological: Negative for weakness.   Hematological: Does not bruise/bleed easily.       Physical Exam     Initial Vitals [01/19/22 1921]   BP Pulse Resp Temp SpO2   117/81 86 18 98.5 °F (36.9 °C) 98 %      MAP       --         Physical Exam    Nursing note and vitals reviewed.  Constitutional: Vital signs are normal. She appears well-developed and well-nourished. She is cooperative.  Non-toxic appearance. She does not appear ill. No distress.   HENT:   Head: Normocephalic and atraumatic.   Eyes: Conjunctivae and lids are normal.   Neck: Neck supple.   Cardiovascular: Normal rate and regular rhythm.   Pulmonary/Chest: Breath sounds normal. No respiratory distress. She has no wheezes. She has no rhonchi.   Abdominal: Abdomen is soft. Normal appearance and bowel sounds are normal. There is no abdominal tenderness. There is no rigidity and no guarding.   Genitourinary:    Genitourinary Comments: Slight pink discharge in the vaginal vault.  Cervical os is closed.  No uterine tenderness or adnexal tenderness     Musculoskeletal:         General: Normal range of motion.      Cervical back: Neck supple. No rigidity.     Neurological: She is alert and oriented to person, place, and time. She has normal strength. GCS eye subscore is 4. GCS verbal subscore is 5. GCS motor subscore is 6.   Skin: Skin is warm, dry and intact. No rash noted.   Psychiatric: She has a normal mood and affect. Her speech is normal and behavior is normal. Thought content normal.         ED Course   Procedures  Labs Reviewed   CBC W/ AUTO DIFFERENTIAL - Abnormal; Notable for the following components:       Result Value    RDW 11.3 (*)     MPV 8.7 (*)     All other components within normal limits    Narrative:     Release to patient->Immediate   VAGINAL SCREEN - Abnormal; Notable for the following components:    Clue Cells Occasional (*)     WBC - Vaginal Screen Rare (*)     Bacteria - Vaginal Screen Occasional (*)     All other  components within normal limits    Narrative:     Release to patient->Immediate   C. TRACHOMATIS/N. GONORRHOEAE BY AMP DNA   HIV 1 / 2 ANTIBODY    Narrative:     Release to patient->Immediate   HEPATITIS C ANTIBODY    Narrative:     Release to patient->Immediate   COMPREHENSIVE METABOLIC PANEL    Narrative:     Release to patient->Immediate   HCG, QUANTITATIVE    Narrative:     Release to patient->Immediate          Imaging Results          US OB <14 Wks TransAbd & TransVag, Single Gestation (XPD) (Final result)  Result time 01/19/22 22:22:33    Final result by Lucie Melendrez MD (01/19/22 22:22:33)                 Impression:      No IUP visualized this time.  Positive beta HCG.  Findings may suggest spontaneous A/B, ectopic pregnancy, or very early pregnancy.  Consider follow-up beta HCG and ultrasound as needed.      Electronically signed by: Lucie Melendrez  Date:    01/19/2022  Time:    22:22             Narrative:    EXAMINATION:  ULTRASOUND OBSTETRICAL ULTRASOUND LESS THAN 14 WEEKS WITH TRANSVAGINAL    CLINICAL HISTORY:  Vag Bleeding;    TECHNIQUE:  Real-time ultrasound obstetrical ultrasound less than 14 weeks was performed transabdominally and  transvaginally.    COMPARISON:  None.    FINDINGS:  The uterus measures 8 x 4 x 5.4 cm. There are no uterine masses.  The endometrium does not appear to be thickened.  No fetal pole, yolk sac, or gestational sac is visualized.    The right ovary measures 4 x 2 x 2.2 cm. The left ovary measures 4 x 1.2 x 2.0 cm.  Arterial and venous flow is seen in both ovaries.    There is no free pelvic fluid.                                 Medications - No data to display  Medical Decision Making:   History:   Old Medical Records: I decided to obtain old medical records.  Initial Assessment:   Emergent evaluation female with bleeding in early pregnancy of unknown location.  Patient reports 6 weeks gestation.  Reports no significant bleeding or tissue passed.  She appears well  in no distress.  Abdomen is soft and nontender.   exam reveals Slight pink discharge in the vaginal vault.  Cervical os is closed.  No uterine tenderness or adnexal tenderness  Differential Diagnosis:   Differential Diagnosis includes, but is not limited to:  Pregnancy complication (threatened AB, inevitable AB, incomplete Ab, missed AB, uterine rupture, ectopic pregnancy, placental abruption, placenta previa), ovarian cyst/torsion, STD, foreign body, trauma, normal menstruation.    Clinical Tests:   Lab Tests: Reviewed and Ordered  Radiological Study: Reviewed and Ordered  ED Management:  Review of patient's outpatient HCG is quite low for patient's gestational age.  We will repeat labs to assess for bleeding and HCG level.  Review patient's chart reveals she is Rh positive.  As she has had no previous imaging will obtain ultrasound.  HCG is increasing however no IUP visualized on ultrasound.  Did discuss possibility of a be verses early pregnancy versus ectopic and that she would need further outpatient evaluation and management.  She was instructed on prompt return the ED for any new or worsening symptoms.  We did discuss with OB and they will arrange for patient to have repeat blood draw in 48 hours.  Patient does have noted clue cells on wet prep however no significant discharge on exam, no coating of vaginal walls, no formal KOH evaluation and no reported discharge from patient we will defer treatment to OB. Strict instructions to follow up with primary care physician or reference provided for further assessment and evaluation. Given instructions to return for any acute symptoms and verbalized understanding of this medical plan.                        Clinical Impression:   Final diagnoses:  [O20.0] Threatened  (Primary)  [O20.9] Vaginal bleeding affecting early pregnancy          ED Disposition Condition    Discharge Stable        ED Prescriptions     None        Follow-up Information     Follow up  With Specialties Details Why Contact Info    Jodi Sheehan MD Obstetrics and Gynecology Schedule an appointment as soon as possible for a visit   [address] [place] [id number]  [phone]             RADHA Frazier  01/21/22 1247

## 2022-01-20 NOTE — FIRST PROVIDER EVALUATION
Emergency Department TeleTriage Encounter Note      CHIEF COMPLAINT    Chief Complaint   Patient presents with    Vaginal Bleeding     Pt, who is approximately 6 weeks pregnant presents to the ER with complaints of vaginal bleeding that started this morning.       VITAL SIGNS   Initial Vitals [01/19/22 1921]   BP Pulse Resp Temp SpO2   117/81 86 18 98.5 °F (36.9 °C) 98 %      MAP       --            ALLERGIES    Review of patient's allergies indicates:  No Known Allergies    PROVIDER TRIAGE NOTE  26-year-old female presents to the ER for evaluation of heavy vaginal bleeding.  The patient states that she is proximate least 6 weeks pregnant.  This morning she began having spotting which turned into heavy bleeding with clots.  She is also experiencing lower abdominal cramping.      ORDERS  Labs Reviewed   HIV 1 / 2 ANTIBODY   HEPATITIS C ANTIBODY   CBC W/ AUTO DIFFERENTIAL   COMPREHENSIVE METABOLIC PANEL   HCG, QUANTITATIVE   POCT URINE PREGNANCY       ED Orders (720h ago, onward)    Start Ordered     Status Ordering Provider    01/19/22 2045 01/19/22 2043  sodium chloride 0.9% bolus 1,000 mL  ED 1 Time         Ordered YOSPEH MURO    01/19/22 2044 01/19/22 2043  Insert peripheral IV  Once         Ordered YOSEPH MURO    01/19/22 2044 01/19/22 2043  CBC W/ AUTO DIFFERENTIAL  STAT         Ordered YOSEPH MURO    01/19/22 2044 01/19/22 2043  Comp. Metabolic Panel  STAT         Ordered YOSEPH MURO    01/19/22 2044 01/19/22 2043  hCG, quantitative  STAT         Ordered YOSEPH MURO    01/19/22 2044 01/19/22 2043  POCT urine pregnancy  Once         Ordered YOSEPH MURO    01/19/22 2044 01/19/22 2043  Setup Pelvic Tray  Once         Ordered YOSEPH MURO    01/19/22 2044 01/19/22 2043  US OB <14 Wks TransAbd & TransVag, Single Gestation (XPD)  1 time imaging         Ordered YOSEPH MURO    01/19/22 1922 01/19/22 1922  HIV 1/2 Ag/Ab (4th Gen)  STAT         Ordered  IMMANUEL VAZQUEZ.    01/19/22 1922 01/19/22 1922  Hepatitis C Antibody  STAT         Ordered IMMANUEL VAZQUEZ.            Virtual Visit Note: The provider triage portion of this emergency department evaluation and documentation was performed via [employer], a HIPAA-compliant telemedicine application, in concert with a tele-presenter in the room. A face to face patient evaluation with one of my colleagues will occur once the patient is placed in an emergency department room.      DISCLAIMER: This note was prepared with [employer] voice recognition transcription software. Garbled syntax, mangled pronouns, and other bizarre constructions may be attributed to that software system.

## 2022-01-20 NOTE — Clinical Note
Can you please schedule this patient for hCG labs at 11:30 today at [facility]? Thanks!

## 2022-01-20 NOTE — ASSESSMENT & PLAN NOTE
Repeat hCG today. If increasing appropriately, likely normal early IUP. If decreasing, possible SAB, if inappropriate rise, possible ectopic pregnancy. Patient given strict pain and bleeding precautions. Will arrange follow up pending hCG values today (749 two days ago).

## 2022-01-21 ENCOUNTER — TELEPHONE (OUTPATIENT)
Dept: OBSTETRICS AND GYNECOLOGY | Facility: CLINIC | Age: 27
End: 2022-01-21
Payer: COMMERCIAL

## 2022-01-21 ENCOUNTER — PATIENT MESSAGE (OUTPATIENT)
Dept: EMERGENCY MEDICINE | Facility: OTHER | Age: 27
End: 2022-01-21
Payer: COMMERCIAL

## 2022-01-21 ENCOUNTER — TELEPHONE (OUTPATIENT)
Dept: EMERGENCY MEDICINE | Facility: OTHER | Age: 27
End: 2022-01-21
Payer: COMMERCIAL

## 2022-01-21 DIAGNOSIS — O20.0 THREATENED ABORTION: Primary | ICD-10-CM

## 2022-01-21 LAB
C TRACH DNA SPEC QL NAA+PROBE: NOT DETECTED
N GONORRHOEA DNA SPEC QL NAA+PROBE: NOT DETECTED

## 2022-01-22 ENCOUNTER — LAB VISIT (OUTPATIENT)
Dept: LAB | Facility: HOSPITAL | Age: 27
End: 2022-01-22
Attending: OBSTETRICS & GYNECOLOGY
Payer: COMMERCIAL

## 2022-01-22 DIAGNOSIS — O20.0 THREATENED ABORTION: ICD-10-CM

## 2022-01-22 LAB — HCG INTACT+B SERPL-ACNC: 1376 MIU/ML

## 2022-01-22 PROCEDURE — 36415 COLL VENOUS BLD VENIPUNCTURE: CPT | Performed by: OBSTETRICS & GYNECOLOGY

## 2022-01-22 PROCEDURE — 84702 CHORIONIC GONADOTROPIN TEST: CPT | Performed by: OBSTETRICS & GYNECOLOGY

## 2022-01-23 ENCOUNTER — TELEPHONE (OUTPATIENT)
Dept: EMERGENCY MEDICINE | Facility: OTHER | Age: 27
End: 2022-01-23
Payer: COMMERCIAL

## 2022-01-23 RX ORDER — METRONIDAZOLE 500 MG/1
500 TABLET ORAL EVERY 12 HOURS
Qty: 14 TABLET | Refills: 0 | Status: SHIPPED | OUTPATIENT
Start: 2022-01-23 | End: 2022-01-30

## 2022-01-23 NOTE — TELEPHONE ENCOUNTER
Patient asked that I call her a she was confused for HCG was results.  We did discuss that her HCG was increasing however she should have follow-up with her OBGYN as she continued with another episode of minimal bleeding.  Patient expressed that she would like treatment for BV despite explaining Amsel's criteria and at that time she was asymptomatic during exam.  I think reasonable to send with treatment and this will be sent to her pharmacy.

## 2022-01-24 ENCOUNTER — LAB VISIT (OUTPATIENT)
Dept: LAB | Facility: HOSPITAL | Age: 27
End: 2022-01-24
Attending: OBSTETRICS & GYNECOLOGY
Payer: COMMERCIAL

## 2022-01-24 DIAGNOSIS — O36.80X0 PREGNANCY OF UNKNOWN ANATOMIC LOCATION: ICD-10-CM

## 2022-01-24 LAB — HCG INTACT+B SERPL-ACNC: 1604 MIU/ML

## 2022-01-24 PROCEDURE — 84702 CHORIONIC GONADOTROPIN TEST: CPT | Performed by: OBSTETRICS & GYNECOLOGY

## 2022-01-24 PROCEDURE — 36415 COLL VENOUS BLD VENIPUNCTURE: CPT | Performed by: OBSTETRICS & GYNECOLOGY

## 2022-01-24 NOTE — TELEPHONE ENCOUNTER
Spoke with patient on the phone. Inappropriate rise in hCG. HCG labs today, then will repeat US on Thursday. Mild spotting. No cramping. Bleeding and ectopic precautions reviewed. Counseled that with inappropriate rise it could either be ectopic vs SAB/nonviable IUP.

## 2022-01-25 ENCOUNTER — TELEPHONE (OUTPATIENT)
Dept: OBSTETRICS AND GYNECOLOGY | Facility: HOSPITAL | Age: 27
End: 2022-01-25
Payer: COMMERCIAL

## 2022-01-25 DIAGNOSIS — O36.80X0 PREGNANCY OF UNKNOWN ANATOMIC LOCATION: Primary | ICD-10-CM

## 2022-01-25 NOTE — TELEPHONE ENCOUNTER
Called to discuss b hcg results with patient. Discussed how levels were still rising, but not appropriately or what we would expect for a viable pregnancy.  Discussed ultrasound once beta hcg levels >/= 3500.  ED precautions given. All questions answered.  Orders placed for beta hcg follow up every two days.     Diana Steinberg MD  OBGYN PGY-1

## 2022-01-26 ENCOUNTER — HOSPITAL ENCOUNTER (EMERGENCY)
Facility: OTHER | Age: 27
Discharge: HOME | End: 2022-01-26
Payer: COMMERCIAL

## 2022-01-26 ENCOUNTER — LAB VISIT (OUTPATIENT)
Dept: LAB | Facility: HOSPITAL | Age: 27
End: 2022-01-26
Payer: COMMERCIAL

## 2022-01-26 ENCOUNTER — PROCEDURE VISIT (OUTPATIENT)
Dept: MATERNAL FETAL MEDICINE | Facility: CLINIC | Age: 27
End: 2022-01-26
Attending: OBSTETRICS & GYNECOLOGY
Payer: COMMERCIAL

## 2022-01-26 ENCOUNTER — DOCUMENTATION ONLY (OUTPATIENT)
Dept: OBSTETRICS AND GYNECOLOGY | Facility: CLINIC | Age: 27
End: 2022-01-26
Payer: COMMERCIAL

## 2022-01-26 VITALS
SYSTOLIC BLOOD PRESSURE: 113 MMHG | HEIGHT: 62 IN | BODY MASS INDEX: 23 KG/M2 | HEART RATE: 79 BPM | RESPIRATION RATE: 18 BRPM | OXYGEN SATURATION: 100 % | WEIGHT: 125 LBS | TEMPERATURE: 98 F | DIASTOLIC BLOOD PRESSURE: 73 MMHG

## 2022-01-26 DIAGNOSIS — O00.90 ECTOPIC PREGNANCY, UNSPECIFIED LOCATION, UNSPECIFIED WHETHER INTRAUTERINE PREGNANCY PRESENT: Primary | ICD-10-CM

## 2022-01-26 DIAGNOSIS — O36.80X0 PREGNANCY OF UNKNOWN ANATOMIC LOCATION: ICD-10-CM

## 2022-01-26 DIAGNOSIS — O36.80X0 PREGNANCY OF UNKNOWN ANATOMIC LOCATION: Primary | ICD-10-CM

## 2022-01-26 LAB — HCG INTACT+B SERPL-ACNC: 1997 MIU/ML

## 2022-01-26 PROCEDURE — 84702 CHORIONIC GONADOTROPIN TEST: CPT

## 2022-01-26 PROCEDURE — 76817 TRANSVAGINAL US OBSTETRIC: CPT | Mod: S$GLB,,, | Performed by: OBSTETRICS & GYNECOLOGY

## 2022-01-26 PROCEDURE — 99213 OFFICE O/P EST LOW 20 MIN: CPT | Mod: ,,, | Performed by: OBSTETRICS & GYNECOLOGY

## 2022-01-26 PROCEDURE — 36415 COLL VENOUS BLD VENIPUNCTURE: CPT

## 2022-01-26 PROCEDURE — 96372 THER/PROPH/DIAG INJ SC/IM: CPT

## 2022-01-26 PROCEDURE — 99213 PR OFFICE/OUTPT VISIT, EST, LEVL III, 20-29 MIN: ICD-10-PCS | Mod: ,,, | Performed by: OBSTETRICS & GYNECOLOGY

## 2022-01-26 PROCEDURE — 76817 US MFM PROCEDURE (VIEWPOINT): ICD-10-PCS | Mod: S$GLB,,, | Performed by: OBSTETRICS & GYNECOLOGY

## 2022-01-26 PROCEDURE — 63600175 PHARM REV CODE 636 W HCPCS: Performed by: STUDENT IN AN ORGANIZED HEALTH CARE EDUCATION/TRAINING PROGRAM

## 2022-01-26 PROCEDURE — 99284 EMERGENCY DEPT VISIT MOD MDM: CPT | Mod: 25

## 2022-01-26 RX ORDER — ONDANSETRON 4 MG/1
4 TABLET, FILM COATED ORAL EVERY 6 HOURS PRN
Qty: 30 TABLET | Refills: 1 | Status: SHIPPED | OUTPATIENT
Start: 2022-01-26 | End: 2023-01-26

## 2022-01-26 RX ORDER — HYDROCODONE BITARTRATE AND ACETAMINOPHEN 5; 325 MG/1; MG/1
1 TABLET ORAL EVERY 6 HOURS PRN
Qty: 5 TABLET | Refills: 0 | OUTPATIENT
Start: 2022-01-26 | End: 2022-02-04

## 2022-01-26 RX ORDER — IBUPROFEN 600 MG/1
600 TABLET ORAL EVERY 6 HOURS PRN
Qty: 30 TABLET | Refills: 1 | OUTPATIENT
Start: 2022-01-26 | End: 2022-02-04

## 2022-01-26 RX ORDER — METHOTREXATE 25 MG/ML
50 INJECTION INTRA-ARTERIAL; INTRAMUSCULAR; INTRATHECAL; INTRAVENOUS ONCE
Status: COMPLETED | OUTPATIENT
Start: 2022-01-26 | End: 2022-01-26

## 2022-01-26 RX ADMIN — METHOTREXATE 80 MG: 25 SOLUTION INTRA-ARTERIAL; INTRAMUSCULAR; INTRATHECAL; INTRAVENOUS at 06:01

## 2022-01-26 NOTE — ED PROVIDER NOTES
Encounter Date: 1/26/2022    Karlene Sweeney is a 26 y.o. presenting for medical management of an ectopic pregnancy diagnosed earlier today with ultrasound.   TVUS results as follows:   Uterine size is normal.   The endometrial stripe measures 12.3 mm. There is no IUP visualized.   The right ovary appears normal.   The left ovary appears normal.   Adjacent and medial to the left ovary, there is a well-circumscribed mass with peripheral color Doppler flow.   In the absence of an IUP, this is concerning for an ectopic pregnancy.   Recommend clinical management per GYN in coordination with recent b-hcg trend and patient symptoms.   Discussed findings with Dr. Stein.                                                        Pt was counseled on all of her options regarding the ectopic pregnancy, including medical and surgical management. Pt opted for methotrexate treatment.   All contraindications and adverse effects discussed.  Risks and benefits of methotrexate administration discussed.    Pt agrees with plan.      History     Chief Complaint   Patient presents with    Ectopic Pregnancy     HPI  Review of patient's allergies indicates:  No Known Allergies  Past Medical History:   Diagnosis Date    Abnormal Pap smear of cervix 2014     Past Surgical History:   Procedure Laterality Date    ELBOW SURGERY Left 2018    SPLENECTOMY, PARTIAL N/A 2018     Family History   Problem Relation Age of Onset    Breast cancer Neg Hx     Colon cancer Neg Hx     Ovarian cancer Neg Hx      Social History     Tobacco Use    Smoking status: Never Smoker    Smokeless tobacco: Never Used   Substance Use Topics    Alcohol use: Yes    Drug use: Yes     Types: Marijuana     Review of Systems   Constitutional: Negative for fever.   HENT: Negative for sore throat.    Respiratory: Negative for shortness of breath.    Cardiovascular: Negative for chest pain.   Gastrointestinal: Negative for nausea.   Genitourinary: Negative for dysuria.    Musculoskeletal: Negative for back pain.   Skin: Negative for rash.   Neurological: Negative for weakness.   Hematological: Does not bruise/bleed easily.       Physical Exam     Initial Vitals [01/26/22 1709]   BP Pulse Resp Temp SpO2   113/73 79 18 98.3 °F (36.8 °C) 100 %      MAP       --         Physical Exam    Nursing note and vitals reviewed.  Constitutional: She appears well-developed and well-nourished. She is not diaphoretic. No distress.   HENT:   Head: Normocephalic and atraumatic.   Eyes: EOM are normal. Pupils are equal, round, and reactive to light.   Neck:   Normal range of motion.  Cardiovascular: Normal rate.   Abdominal: Abdomen is soft. She exhibits no distension. There is no abdominal tenderness. There is no rebound and no guarding.   Musculoskeletal:         General: No tenderness or edema. Normal range of motion.      Cervical back: Normal range of motion.     Neurological: She is alert and oriented to person, place, and time.   Skin: Skin is warm and dry.         ED Course   Procedures  Labs Reviewed - No data to display       Imaging Results    None          Medications   methotrexate (PF) injection 80 mg (has no administration in time range)     Medical Decision Making:   ED Management:  VSS and WNL  MTX ADMINISTERED  Pt knows to follow up with Hcg on day 4 and day 7  Hcgs ordered  Norco, ibuprofen, zofran ordered for s/p MTX treatment   Pt agrees with plan  All questions answered.                     Diana Steinberg MD  OBGYN PGY-1    Clinical Impression:   Final diagnoses:  [O00.90] Ectopic pregnancy, unspecified location, unspecified whether intrauterine pregnancy present (Primary)                 Diana Steinberg MD  Resident  01/26/22 8527

## 2022-01-27 NOTE — DISCHARGE INSTRUCTIONS
Patient Education       Ectopic Pregnancy Discharge Instructions   About this topic   In a normal pregnancy, the baby grows inside your womb or uterus. In an ectopic pregnancy, the baby tries to grow outside of your womb. The baby cannot live outside your womb. There is not enough space for the baby to grow. Also, the baby would not get the nutrients it needs. Ectopic pregnancy can be very serious if not treated right away. Your doctor may give you a shot to treat this problem or you may need surgery.  What care is needed at home?   · You may have a hard time when you learn about the problem with your pregnancy. Get help from others. Rest is important.  · Ask your doctor what you need to do when you go home. Make sure you ask questions if you do not understand what you need to do.  · Ask your doctor when you may:  ? Go back to your daily routine.  ? Have sex.  ? Take any over-the-counter (OTC) drugs.  ? Take showers or baths.  ? Use tampons or douches.  ? Try to have another baby. Use birth control you can trust until then.  · If you were given a shot to treat your ectopic pregnancy, be sure to avoid:  ? Sun, sunlamps, and tanning beds. You may get sunburned more easily. Use sunscreen and wear clothing and eyewear that protects you from the sun.  ? Alcohol  ? Vitamins or foods with folic acid in them. Talk to your doctor about when you can take them again.  ? Green leafy vegetables or beans, orange juice, or pasta  ? Heavy exercise  ? Sexual intercourse  ? Drugs like nonsteroidal anti-inflammatory drugs (NSAIDs), aspirin, ibuprofen (Advil), or naproxen (Naprosyn).  ? Foods that produce gas. These can cause abdominal pain and you may not be able to tell if your fallopian tube ruptures.  What follow-up care is needed?   Your doctor may ask you to make visits to the office to check on your progress. Be sure to keep your visits.  What drugs may be needed?   The doctor may order drugs to end your pregnancy. Be sure to  take all the drugs as told by your doctor.  Will physical activity be limited?   You may have to limit your activity for a little while. Ask your doctor about when it is safe for you to do activities like running, working out, or playing sports.  What problems could happen?   · You have a higher chance of having an ectopic pregnancy again.  · You may have a harder time having a successful pregnancy in the future.  · Internal bleeding that leads to shock. An emergency surgery may be done.  What can be done to prevent this health problem?   In most cases, there is nothing a woman can do to stop an ectopic pregnancy. You may lower your chance by:  · Practicing safe sex, such as use of condoms. Safe sex can help to avoid sexually-transmitted diseases (STDs).  · Getting treated for STDs as soon as possible.  · Avoiding smoking  When do I need to call the doctor?   · Signs of infection such as a fever of 100.4°F (38°C) or higher, chills, pain with passing urine, foul smelling discharge.  · A very heavy period or vaginal bleeding that does not stop  · Feeling faint or dizzy  · Loose stools for more than 24 hours  · Pain that does not go away even when drugs are taken. Mild cramps are normal.  · Pain that starts in your belly and then goes to your shoulder  Teach Back: Helping You Understand   The Teach Back Method helps you understand the information we are giving you. After you talk with the staff, tell them in your own words what you learned. This helps to make sure the staff has described each thing clearly. It also helps to explain things that may have been confusing. Before going home, make sure you can do these:  · I can tell you about my condition.  · I can tell you what changes I need to make with my activities.  · I can tell you what I will do if I have large amounts of vaginal bleeding, or I feel faint or dizzy.  Where can I learn more?   American College of Obstetricians and  Gynecologists  https://www.acog.org/Patients/FAQs/Ectopic-Pregnancy   American Pregnancy  https://americanpregnancy.org/pregnancy-complications/ectopic-pregnancy/   NHS Choices  http://www.nhs.uk/conditions/ectopic-pregnancy/pages/introduction.aspx   Last Reviewed Date   2020-02-24  Consumer Information Use and Disclaimer   This information is not specific medical advice and does not replace information you receive from your health care provider. This is only a brief summary of general information. It does NOT include all information about conditions, illnesses, injuries, tests, procedures, treatments, therapies, discharge instructions or life-style choices that may apply to you. You must talk with your health care provider for complete information about your health and treatment options. This information should not be used to decide whether or not to accept your health care providers advice, instructions or recommendations. Only your health care provider has the knowledge and training to provide advice that is right for you.  Copyright   Copyright © 2021 [employer], [employer] and its affiliates and/or licensors. All rights reserved.

## 2022-01-29 ENCOUNTER — LAB VISIT (OUTPATIENT)
Dept: LAB | Facility: HOSPITAL | Age: 27
End: 2022-01-29
Payer: COMMERCIAL

## 2022-01-29 DIAGNOSIS — O36.80X0 PREGNANCY OF UNKNOWN ANATOMIC LOCATION: ICD-10-CM

## 2022-01-29 LAB — HCG INTACT+B SERPL-ACNC: 3071 MIU/ML

## 2022-01-29 PROCEDURE — 36415 COLL VENOUS BLD VENIPUNCTURE: CPT

## 2022-01-29 PROCEDURE — 84702 CHORIONIC GONADOTROPIN TEST: CPT

## 2022-01-31 ENCOUNTER — LAB VISIT (OUTPATIENT)
Dept: LAB | Facility: HOSPITAL | Age: 27
End: 2022-01-31
Payer: COMMERCIAL

## 2022-01-31 DIAGNOSIS — O36.80X0 PREGNANCY OF UNKNOWN ANATOMIC LOCATION: ICD-10-CM

## 2022-01-31 LAB — HCG INTACT+B SERPL-ACNC: 3131 MIU/ML

## 2022-01-31 PROCEDURE — 36415 COLL VENOUS BLD VENIPUNCTURE: CPT

## 2022-01-31 PROCEDURE — 84702 CHORIONIC GONADOTROPIN TEST: CPT

## 2022-02-01 ENCOUNTER — TELEPHONE (OUTPATIENT)
Dept: OBSTETRICS AND GYNECOLOGY | Facility: HOSPITAL | Age: 27
End: 2022-02-01
Payer: COMMERCIAL

## 2022-02-01 DIAGNOSIS — O00.90 ECTOPIC PREGNANCY, UNSPECIFIED LOCATION, UNSPECIFIED WHETHER INTRAUTERINE PREGNANCY PRESENT: Primary | ICD-10-CM

## 2022-02-01 DIAGNOSIS — R10.2 PELVIC PAIN: ICD-10-CM

## 2022-02-01 NOTE — TELEPHONE ENCOUNTER
Pt called and notified of up-trending hcg s/p methotrexate.  Day 1- 1997>>>Day 4- 3071>>>Day 6- 3131    Pt to present to lab today to have bhcg drawn, and to be administered a second dose of methotrexate.  Pt told that there is a 90% chance that a second dose of methotrexate will treat the ectopic pregnancy; if her hcg does not fall appropriately, will have to discuss surgery as the next option.  Pt notified to present on day 11, Saturday, to have labs drawn.  ED precautions given.  Pt confirms understanding.  All questions answered.    Injection center at [facility] called and notified of need for pt to receive second dose of methotrexate. Will return call to confirm pt appointment today.     Diana Steinberg MD  OBGYN PGY-1

## 2022-02-02 ENCOUNTER — INFUSION (OUTPATIENT)
Dept: INFUSION THERAPY | Facility: OTHER | Age: 27
End: 2022-02-02
Attending: STUDENT IN AN ORGANIZED HEALTH CARE EDUCATION/TRAINING PROGRAM
Payer: COMMERCIAL

## 2022-02-02 ENCOUNTER — TELEPHONE (OUTPATIENT)
Dept: OBSTETRICS AND GYNECOLOGY | Facility: CLINIC | Age: 27
End: 2022-02-02
Payer: COMMERCIAL

## 2022-02-02 ENCOUNTER — LAB VISIT (OUTPATIENT)
Dept: LAB | Facility: OTHER | Age: 27
End: 2022-02-02
Payer: COMMERCIAL

## 2022-02-02 ENCOUNTER — OFFICE VISIT (OUTPATIENT)
Dept: OBSTETRICS AND GYNECOLOGY | Facility: CLINIC | Age: 27
End: 2022-02-02
Payer: COMMERCIAL

## 2022-02-02 ENCOUNTER — PATIENT MESSAGE (OUTPATIENT)
Dept: OBSTETRICS AND GYNECOLOGY | Facility: CLINIC | Age: 27
End: 2022-02-02
Payer: COMMERCIAL

## 2022-02-02 VITALS
TEMPERATURE: 99 F | HEIGHT: 62 IN | BODY MASS INDEX: 23.53 KG/M2 | OXYGEN SATURATION: 100 % | HEART RATE: 71 BPM | DIASTOLIC BLOOD PRESSURE: 78 MMHG | RESPIRATION RATE: 17 BRPM | WEIGHT: 127.88 LBS | SYSTOLIC BLOOD PRESSURE: 113 MMHG

## 2022-02-02 DIAGNOSIS — O00.90 ECTOPIC PREGNANCY, UNSPECIFIED LOCATION, UNSPECIFIED WHETHER INTRAUTERINE PREGNANCY PRESENT: Primary | ICD-10-CM

## 2022-02-02 DIAGNOSIS — O00.90 ECTOPIC PREGNANCY, UNSPECIFIED LOCATION, UNSPECIFIED WHETHER INTRAUTERINE PREGNANCY PRESENT: ICD-10-CM

## 2022-02-02 LAB
ALBUMIN SERPL BCP-MCNC: 4.1 G/DL (ref 3.5–5.2)
ALP SERPL-CCNC: 70 U/L (ref 55–135)
ALT SERPL W/O P-5'-P-CCNC: 9 U/L (ref 10–44)
ANION GAP SERPL CALC-SCNC: 7 MMOL/L (ref 8–16)
AST SERPL-CCNC: 14 U/L (ref 10–40)
BASOPHILS # BLD AUTO: 0.03 K/UL (ref 0–0.2)
BASOPHILS NFR BLD: 0.7 % (ref 0–1.9)
BILIRUB SERPL-MCNC: 0.4 MG/DL (ref 0.1–1)
BUN SERPL-MCNC: 7 MG/DL (ref 6–20)
CALCIUM SERPL-MCNC: 9.6 MG/DL (ref 8.7–10.5)
CHLORIDE SERPL-SCNC: 106 MMOL/L (ref 95–110)
CO2 SERPL-SCNC: 25 MMOL/L (ref 23–29)
CREAT SERPL-MCNC: 0.8 MG/DL (ref 0.5–1.4)
DIFFERENTIAL METHOD: ABNORMAL
EOSINOPHIL # BLD AUTO: 0.2 K/UL (ref 0–0.5)
EOSINOPHIL NFR BLD: 4.4 % (ref 0–8)
ERYTHROCYTE [DISTWIDTH] IN BLOOD BY AUTOMATED COUNT: 11.6 % (ref 11.5–14.5)
EST. GFR  (AFRICAN AMERICAN): >60 ML/MIN/1.73 M^2
EST. GFR  (NON AFRICAN AMERICAN): >60 ML/MIN/1.73 M^2
GLUCOSE SERPL-MCNC: 81 MG/DL (ref 70–110)
HCG INTACT+B SERPL-ACNC: 2728 MIU/ML
HCT VFR BLD AUTO: 39.6 % (ref 37–48.5)
HGB BLD-MCNC: 13.4 G/DL (ref 12–16)
IMM GRANULOCYTES # BLD AUTO: 0.01 K/UL (ref 0–0.04)
IMM GRANULOCYTES NFR BLD AUTO: 0.2 % (ref 0–0.5)
LYMPHOCYTES # BLD AUTO: 1.9 K/UL (ref 1–4.8)
LYMPHOCYTES NFR BLD: 46.9 % (ref 18–48)
MCH RBC QN AUTO: 29.8 PG (ref 27–31)
MCHC RBC AUTO-ENTMCNC: 33.8 G/DL (ref 32–36)
MCV RBC AUTO: 88 FL (ref 82–98)
MONOCYTES # BLD AUTO: 0.5 K/UL (ref 0.3–1)
MONOCYTES NFR BLD: 13 % (ref 4–15)
NEUTROPHILS # BLD AUTO: 1.4 K/UL (ref 1.8–7.7)
NEUTROPHILS NFR BLD: 34.8 % (ref 38–73)
NRBC BLD-RTO: 0 /100 WBC
PLATELET # BLD AUTO: 338 K/UL (ref 150–450)
PMV BLD AUTO: 8.3 FL (ref 9.2–12.9)
POTASSIUM SERPL-SCNC: 4.1 MMOL/L (ref 3.5–5.1)
PROT SERPL-MCNC: 7.4 G/DL (ref 6–8.4)
RBC # BLD AUTO: 4.49 M/UL (ref 4–5.4)
SODIUM SERPL-SCNC: 138 MMOL/L (ref 136–145)
WBC # BLD AUTO: 4.09 K/UL (ref 3.9–12.7)

## 2022-02-02 PROCEDURE — 99214 PR OFFICE/OUTPT VISIT, EST, LEVL IV, 30-39 MIN: ICD-10-PCS | Mod: 95,,, | Performed by: OBSTETRICS & GYNECOLOGY

## 2022-02-02 PROCEDURE — 36415 COLL VENOUS BLD VENIPUNCTURE: CPT | Performed by: OBSTETRICS & GYNECOLOGY

## 2022-02-02 PROCEDURE — 85025 COMPLETE CBC W/AUTO DIFF WBC: CPT | Performed by: OBSTETRICS & GYNECOLOGY

## 2022-02-02 PROCEDURE — 84702 CHORIONIC GONADOTROPIN TEST: CPT | Performed by: OBSTETRICS & GYNECOLOGY

## 2022-02-02 PROCEDURE — 99214 OFFICE O/P EST MOD 30 MIN: CPT | Mod: 95,,, | Performed by: OBSTETRICS & GYNECOLOGY

## 2022-02-02 PROCEDURE — 63600175 PHARM REV CODE 636 W HCPCS: Performed by: OBSTETRICS & GYNECOLOGY

## 2022-02-02 PROCEDURE — 96402 CHEMO HORMON ANTINEOPL SQ/IM: CPT

## 2022-02-02 PROCEDURE — 80053 COMPREHEN METABOLIC PANEL: CPT | Performed by: OBSTETRICS & GYNECOLOGY

## 2022-02-02 RX ORDER — METHOTREXATE 25 MG/ML
50 INJECTION INTRA-ARTERIAL; INTRAMUSCULAR; INTRATHECAL; INTRAVENOUS
Status: COMPLETED | OUTPATIENT
Start: 2022-02-02 | End: 2022-02-02

## 2022-02-02 RX ORDER — METHOTREXATE 25 MG/ML
50 INJECTION INTRA-ARTERIAL; INTRAMUSCULAR; INTRATHECAL; INTRAVENOUS
Status: CANCELLED | OUTPATIENT
Start: 2022-02-09

## 2022-02-02 RX ORDER — METHOTREXATE 25 MG/ML
50 INJECTION INTRA-ARTERIAL; INTRAMUSCULAR; INTRATHECAL; INTRAVENOUS
Status: CANCELLED | OUTPATIENT
Start: 2022-02-02

## 2022-02-02 RX ADMIN — METHOTREXATE 80 MG: 25 SOLUTION INTRA-ARTERIAL; INTRAMUSCULAR; INTRATHECAL; INTRAVENOUS at 04:02

## 2022-02-02 NOTE — PROGRESS NOTES
The patient location is: [facility]  The chief complaint leading to consultation is: Ectopic pregnancy s/p MTX  Visit type: audiovisual  Total time spent with patient: 15 minutes    Each patient to whom he or she provides medical services by telemedicine is:  (1) informed of the relationship between the physician and patient and the respective role of any other health care provider with respect to management of the patient; and (2) notified that he or she may decline to receive medical services by telemedicine and may withdraw from such care at any time.      Chief Complaint: Ectopic pregnancy s/p 1 dose MTX     HPI:      Karlene Sweeney is a 26 y.o.  who presents via virtual visit to discuss ectopic pregnancy, s/p MTX on . HCG labs were not trended appropriately and were obtained on day 4 and day 6, but numbers alok, so here today to discuss treatment with a second dose vs surgical management.     Patient feeling well overall today with no new complaints. She had some mild cramping after receiving MTX but denies current cramping or bleeding and is feeling well.       ROS:     GENERAL: Denies fevers or chills. Feeling well overall.   ABDOMEN: Denies abdominal pain, constipation, diarrhea, nausea, vomiting, change in appetite.   URINARY: Denies frequency, dysuria, hematuria.  GYNECOLOGIC: See HPI.  NEUROLOGIC: Denies syncope or weakness.     Physical Exam:      PHYSICAL EXAM:  LMP 2021   There is no height or weight on file to calculate BMI.     APPEARANCE: Well nourished, well developed, in no acute distress.  Exam deferred due to televisit    Results:      Day 1 ():    Day 4 (): 3071  Day 6 (): 3131  Day 7: Not done  TVUS (22): Adjacent and medial to the left ovary, there is a well-circumscribed mass with peripheral color Doppler flow. In the absence of an IUP, this is concerning for an ectopic pregnancy.       Assessment/Plan:     Problem List Items Addressed This Visit         Obstetric    Ectopic pregnancy - Primary    Current Assessment & Plan     Inappropriate response to initial dose of MTX. Discussed second dose vs surgical management. Patient elects for second dose. Orders placed, will repeat hCG today and day 4 and 7. RTC after day 7 labs are drawn to discuss results. Rupture precautions discussed. Otherwise feeling well today.          Relevant Orders    CBC Auto Differential (Completed)    Comprehensive Metabolic Panel (Completed)    HCG, Quantitative (Completed)    HCG, Quantitative    HCG, Quantitative              Counseling:       Use of the [facility] Patient Portal discussed and encouraged during today's visit.       Jodi Sheehan MD  Department of Obstetrics & Gynecology  [facility]

## 2022-02-02 NOTE — PLAN OF CARE
Vital Signs Stable, No apparent distress noted; Pt tolerated ___MTX IM to both buttock___w/o difficulty.  No bleeding,swelling or drainage noted to the site;bandages applied  Treatment info and Discharge instructions given;Pt verbalizes understanding   Pt discharged after observation period, to home per self

## 2022-02-02 NOTE — TELEPHONE ENCOUNTER
----- Message from Jodi Sheehan MD sent at 2/2/2022  4:13 PM CST -----  Regarding: schedule labs and follow up  Please call the patient to schedule follow up labs (have already been ordered by me) on 2/5 and 2/8, and the patient should have an in-office follow up with me on 2/8 after her labs have been drawn (at least an hour after the lab appointment). Okay to overbook. She does not need to come to the clinic after her infusion today. Thanks.

## 2022-02-03 NOTE — ASSESSMENT & PLAN NOTE
Inappropriate response to initial dose of MTX. Discussed second dose vs surgical management. Patient elects for second dose. Orders placed, will repeat hCG today and day 4 and 7. RTC after day 7 labs are drawn to discuss results. Rupture precautions discussed. Otherwise feeling well today.

## 2022-02-04 ENCOUNTER — HOSPITAL ENCOUNTER (EMERGENCY)
Facility: OTHER | Age: 27
Discharge: HOME | End: 2022-02-04
Attending: EMERGENCY MEDICINE
Payer: COMMERCIAL

## 2022-02-04 VITALS
DIASTOLIC BLOOD PRESSURE: 54 MMHG | TEMPERATURE: 98 F | OXYGEN SATURATION: 100 % | SYSTOLIC BLOOD PRESSURE: 98 MMHG | HEART RATE: 85 BPM | RESPIRATION RATE: 18 BRPM

## 2022-02-04 DIAGNOSIS — N93.9 VAGINAL BLEEDING: Primary | ICD-10-CM

## 2022-02-04 DIAGNOSIS — O00.90 ECTOPIC PREGNANCY, UNSPECIFIED LOCATION, UNSPECIFIED WHETHER INTRAUTERINE PREGNANCY PRESENT: ICD-10-CM

## 2022-02-04 LAB
ALBUMIN SERPL BCP-MCNC: 3.9 G/DL (ref 3.5–5.2)
ALP SERPL-CCNC: 68 U/L (ref 55–135)
ALT SERPL W/O P-5'-P-CCNC: 28 U/L (ref 10–44)
ANION GAP SERPL CALC-SCNC: 9 MMOL/L (ref 8–16)
AST SERPL-CCNC: 36 U/L (ref 10–40)
BASOPHILS # BLD AUTO: 0.03 K/UL (ref 0–0.2)
BASOPHILS NFR BLD: 0.6 % (ref 0–1.9)
BILIRUB SERPL-MCNC: 0.2 MG/DL (ref 0.1–1)
BUN SERPL-MCNC: 10 MG/DL (ref 6–20)
CALCIUM SERPL-MCNC: 9.3 MG/DL (ref 8.7–10.5)
CHLORIDE SERPL-SCNC: 106 MMOL/L (ref 95–110)
CO2 SERPL-SCNC: 23 MMOL/L (ref 23–29)
CREAT SERPL-MCNC: 0.8 MG/DL (ref 0.5–1.4)
DIFFERENTIAL METHOD: ABNORMAL
EOSINOPHIL # BLD AUTO: 0.2 K/UL (ref 0–0.5)
EOSINOPHIL NFR BLD: 4.6 % (ref 0–8)
ERYTHROCYTE [DISTWIDTH] IN BLOOD BY AUTOMATED COUNT: 11.7 % (ref 11.5–14.5)
EST. GFR  (AFRICAN AMERICAN): >60 ML/MIN/1.73 M^2
EST. GFR  (NON AFRICAN AMERICAN): >60 ML/MIN/1.73 M^2
GLUCOSE SERPL-MCNC: 97 MG/DL (ref 70–110)
HCG INTACT+B SERPL-ACNC: 2305 MIU/ML
HCT VFR BLD AUTO: 37.9 % (ref 37–48.5)
HGB BLD-MCNC: 12.4 G/DL (ref 12–16)
IMM GRANULOCYTES # BLD AUTO: 0.01 K/UL (ref 0–0.04)
IMM GRANULOCYTES NFR BLD AUTO: 0.2 % (ref 0–0.5)
LYMPHOCYTES # BLD AUTO: 1.8 K/UL (ref 1–4.8)
LYMPHOCYTES NFR BLD: 36.9 % (ref 18–48)
MCH RBC QN AUTO: 29.3 PG (ref 27–31)
MCHC RBC AUTO-ENTMCNC: 32.7 G/DL (ref 32–36)
MCV RBC AUTO: 90 FL (ref 82–98)
MONOCYTES # BLD AUTO: 0.5 K/UL (ref 0.3–1)
MONOCYTES NFR BLD: 10.5 % (ref 4–15)
NEUTROPHILS # BLD AUTO: 2.2 K/UL (ref 1.8–7.7)
NEUTROPHILS NFR BLD: 47.2 % (ref 38–73)
NRBC BLD-RTO: 0 /100 WBC
PLATELET # BLD AUTO: 295 K/UL (ref 150–450)
PMV BLD AUTO: 8.6 FL (ref 9.2–12.9)
POTASSIUM SERPL-SCNC: 4.2 MMOL/L (ref 3.5–5.1)
PROT SERPL-MCNC: 7.2 G/DL (ref 6–8.4)
RBC # BLD AUTO: 4.23 M/UL (ref 4–5.4)
SODIUM SERPL-SCNC: 138 MMOL/L (ref 136–145)
WBC # BLD AUTO: 4.74 K/UL (ref 3.9–12.7)

## 2022-02-04 PROCEDURE — 84702 CHORIONIC GONADOTROPIN TEST: CPT | Performed by: EMERGENCY MEDICINE

## 2022-02-04 PROCEDURE — 99284 EMERGENCY DEPT VISIT MOD MDM: CPT | Mod: 25

## 2022-02-04 PROCEDURE — 80053 COMPREHEN METABOLIC PANEL: CPT | Performed by: EMERGENCY MEDICINE

## 2022-02-04 PROCEDURE — 63600175 PHARM REV CODE 636 W HCPCS: Performed by: EMERGENCY MEDICINE

## 2022-02-04 PROCEDURE — 96374 THER/PROPH/DIAG INJ IV PUSH: CPT

## 2022-02-04 PROCEDURE — 85025 COMPLETE CBC W/AUTO DIFF WBC: CPT | Performed by: EMERGENCY MEDICINE

## 2022-02-04 RX ORDER — HYDROCODONE BITARTRATE AND ACETAMINOPHEN 7.5; 325 MG/1; MG/1
1 TABLET ORAL EVERY 6 HOURS PRN
Qty: 12 TABLET | Refills: 0 | Status: SHIPPED | OUTPATIENT
Start: 2022-02-04

## 2022-02-04 RX ORDER — KETOROLAC TROMETHAMINE 30 MG/ML
10 INJECTION, SOLUTION INTRAMUSCULAR; INTRAVENOUS
Status: COMPLETED | OUTPATIENT
Start: 2022-02-04 | End: 2022-02-04

## 2022-02-04 RX ADMIN — KETOROLAC TROMETHAMINE 10 MG: 30 INJECTION, SOLUTION INTRAMUSCULAR; INTRAVENOUS at 06:02

## 2022-02-04 NOTE — ED TRIAGE NOTES
"Pt presents to the ED c/o vaginal bleeding. Pt reports using the restroom and having "lots of bleeding, clots, and heavy flow." Reports that she received her 2nd methotrexate shot yesterday. Reports soaking 2 pads an hour. Denies chest pain, SOB, weakness, body aches, or headache. AAOx4  "

## 2022-02-04 NOTE — ED PROVIDER NOTES
"Encounter Date: 2/4/2022    SCRIBE #1 NOTE: I, Jessica Johns, am scribing for, and in the presence of, Joseph Vieyra MD.       History     Chief Complaint   Patient presents with    Vaginal Bleeding     Pt received methotrexate shot yesterday c/o heavy vaginal bleeding "pt states I need to make sure nothing ruptured in there"      Time seen by provider: 6:16 AM    This is a 26 y.o. female who presents with complaint of vaginal bleeding. The patient received 2 shots of methotrexate due to the fact that she was having an ectopic pregnancy. After the first shot 1 week ago, the patient states that she had some mild cramping and bleeding. She received the second shot 2 days ago and reports having much stronger symptoms including generalized pain and heavy vaginal bleeding. The patient has been taking extra strength Tylenol every hour and took hydrocodone once before arrival for the pain with minimal relief. She states that she is soaking through 2-3 pads every hour since 14 hours ago. She denies fever, chills, or cramping. She states that she feels very hot and is somewhat fatigued. She reports a loss of appetite. The patient has had 3 previous pregnancies which were all miscarriages. This is the extent of the patient's complaints at this time.      The history is provided by the patient.     Review of patient's allergies indicates:  No Known Allergies  Past Medical History:   Diagnosis Date    Abnormal Pap smear of cervix 2014     Past Surgical History:   Procedure Laterality Date    ELBOW SURGERY Left 2018    SPLENECTOMY, PARTIAL N/A 2018     Family History   Problem Relation Age of Onset    Breast cancer Neg Hx     Colon cancer Neg Hx     Ovarian cancer Neg Hx      Social History     Tobacco Use    Smoking status: Never Smoker    Smokeless tobacco: Never Used   Substance Use Topics    Alcohol use: Yes    Drug use: Yes     Types: Marijuana     Review of Systems   Constitutional: Positive for appetite " change and fatigue. Negative for chills and fever.        Generalized pain.   HENT: Negative for rhinorrhea, sore throat and trouble swallowing.    Respiratory: Negative for chest tightness, shortness of breath and wheezing.    Cardiovascular: Negative for chest pain, palpitations and leg swelling.   Gastrointestinal: Negative for abdominal pain, diarrhea, nausea and vomiting.   Genitourinary: Positive for vaginal bleeding. Negative for dysuria and pelvic pain.   Neurological: Negative for speech difficulty and light-headedness.   All other systems reviewed and are negative.      Physical Exam     Initial Vitals [02/04/22 0501]   BP Pulse Resp Temp SpO2   123/81 82 18 98.4 °F (36.9 °C) 100 %      MAP       --         Physical Exam    Nursing note and vitals reviewed.  Constitutional: She appears well-developed and well-nourished. She is not diaphoretic. No distress.   HENT:   Head: Normocephalic and atraumatic.   Right Ear: External ear normal.   Left Ear: External ear normal.   Eyes: Conjunctivae and EOM are normal. Pupils are equal, round, and reactive to light.   Neck: Neck supple. No tracheal deviation present.   Normal range of motion.  Cardiovascular: Normal rate, regular rhythm, normal heart sounds and intact distal pulses. Exam reveals no gallop and no friction rub.    No murmur heard.  Pulmonary/Chest: Breath sounds normal. No respiratory distress. She has no wheezes. She has no rhonchi. She has no rales. She exhibits no tenderness.   Abdominal: Abdomen is soft. Bowel sounds are normal. She exhibits no distension and no mass. There is no abdominal tenderness. There is no rebound and no guarding.   Musculoskeletal:         General: Normal range of motion.      Cervical back: Normal range of motion and neck supple.     Neurological: She is alert and oriented to person, place, and time. She has normal strength.   Skin: Skin is warm and dry. Capillary refill takes less than 2 seconds.   Psychiatric: She has a  normal mood and affect. Thought content normal.         ED Course   Procedures  Labs Reviewed   CBC W/ AUTO DIFFERENTIAL - Abnormal; Notable for the following components:       Result Value    MPV 8.6 (*)     All other components within normal limits    Narrative:     Release to patient->Immediate   COMPREHENSIVE METABOLIC PANEL    Narrative:     Release to patient->Immediate   HCG, QUANTITATIVE    Narrative:     Release to patient->Immediate          Imaging Results           US OB <14 Wks TransAbd & TransVag, Single Gestation (XPD) (Final result)  Result time 02/04/22 09:02:15    Final result by aDrleen Capellan MD (02/04/22 09:02:15)                 Impression:      No evidence of intrauterine pregnancy.    In this patient with a known ectopic recently treated with methotrexate, there is a small mass adjacent to the left ovary, may represent patient's known ectopic.    This report was flagged in Epic as abnormal.      Electronically signed by: Darleen Capellan MD  Date:    02/04/2022  Time:    09:02             Narrative:    EXAMINATION:  US OB <14 WEEKS, TRANSABDOM & TRANSVAG, SINGLE GESTATION (XPD)    CLINICAL HISTORY:  Vag Bleeding;    TECHNIQUE:  Transabdominal sonography of the pelvis was performed, followed by transvaginal sonography to better evaluate the uterus and ovaries.    COMPARISON:  January 19, 2022    FINDINGS:  The uterus measures 7.6 cm in length.    There is trace fluid within the pelvis.    The endometrial stripe measures 7 mm.  There is no evidence of an intrauterine gestational sac.    The right ovary measures 2.6 by 1.3 x 3.1 cm.    The left ovary measures 2.8 by 2.6 x 1.4 cm.  Medial to the left ovary there is a 2.4 x 1.6 x 1.5 cm heterogeneous but predominantly hyperechoic structure without internal vascularity.                                 Medications   ketorolac injection 9.999 mg (9.999 mg Intravenous Given 2/4/22 2574)     Medical Decision Making:   History:   Old Medical Records:  I decided to obtain old medical records.  Differential Diagnosis:   Ectopic pregnancy, threatened miscarriage, miscarriage, ovarian torsion, ovarian cyst rupture, PID, BV, TOA, urinary tract infection, acute pyelonephritis, ureterolithiais, AAA rupture / dissection, diverticulitis, colitis, inflammatory bowel disease, gastroenteritis, gastritis, ulcer, cholecystitis, gallstones, pancreatitis, ileus, small bowel obstruction, appendicitis, constipation, intestinal gas pain, GERD, intestinal spasm,  intrabominal hemorrhage, intrabominal thrombus      Clinical Tests:   Lab Tests: Ordered and Reviewed  Radiological Study: Ordered and Reviewed  ED Management:  Pt's beta is decreasing at the appropriate level, she is hemodynamically stable, she has a benign exam, appropriate ultrasound and with instruction from OBGYN I feel it is safe and appropriate at this time for the patient to be discharged for follow up and re-evaluation as detailed in the discharge instructions. No further workup indicated based on their complaints or examination today. Discussed results with the patient. I educated the patient/guardian on the warning signs and symptoms for which they must seek immediate medical attention. All questions addressed and patient/guardian were given discharge instructions and followup information.     Management decisions for this encounter made during a severe acute wave of the COVID- public health emergency. Available resources, standards for appropriate emergency department evaluation, and admission vs. discharge standards have necessarily shifted and remain dynamic.     Note was created using voice recognition software. It may have occasional typographical errors not identified and edited despite initial review prior to signing.            Scribe Attestation:   Scribe #1: I performed the above scribed service and the documentation accurately describes the services I performed. I attest to the accuracy of the  note.        ED Course as of 02/04/22 1015   Fri Feb 04, 2022   0913 Case discussed with OBGYN, they will review patient's case. [MA]   0939 Patient is not actively having vaginal bleeding at this time.  OBGYN reviewed patient's lab work and ultrasound.  They report that she is progressing as expected, no concerns for rupture at this time.  Discharged home with follow-up [MA]      ED Course User Index  [MA] Joseph Vieyra MD          Physician Attestation for Scribe: I, MAA, reviewed documentation as scribed in my presence, which is both accurate and complete.      Clinical Impression:   Final diagnoses:  [N93.9] Vaginal bleeding (Primary)  [O00.90] Ectopic pregnancy, unspecified location, unspecified whether intrauterine pregnancy present          ED Disposition Condition    Discharge Stable        ED Prescriptions     Medication Sig Dispense Start Date End Date Auth. Provider    HYDROcodone-acetaminophen (NORCO) 7.5-325 mg per tablet Take 1 tablet by mouth every 6 (six) hours as needed for Pain. 12 tablet 2/4/2022  Joseph Vieyra MD        Follow-up Information     Follow up With Specialties Details Why Contact Info    [place] Obstetrics and Gynecology Schedule an appointment as soon as possible for a visit in 3 days For follow-up and re-evaluation by OBGYN [address], [address]  [facility] [address] [phone]    [facility] Emergency Medicine  As needed, for any new or worsening symptoms [address]  [facility] [id number] [phone]           Joseph Vieyra MD  02/04/22 1017

## 2022-02-05 ENCOUNTER — LAB VISIT (OUTPATIENT)
Dept: LAB | Facility: HOSPITAL | Age: 27
End: 2022-02-05
Attending: OBSTETRICS & GYNECOLOGY
Payer: COMMERCIAL

## 2022-02-05 DIAGNOSIS — O00.90 ECTOPIC PREGNANCY, UNSPECIFIED LOCATION, UNSPECIFIED WHETHER INTRAUTERINE PREGNANCY PRESENT: ICD-10-CM

## 2022-02-05 LAB — HCG INTACT+B SERPL-ACNC: 2411 MIU/ML

## 2022-02-05 PROCEDURE — 36415 COLL VENOUS BLD VENIPUNCTURE: CPT | Performed by: OBSTETRICS & GYNECOLOGY

## 2022-02-05 PROCEDURE — 84702 CHORIONIC GONADOTROPIN TEST: CPT | Performed by: OBSTETRICS & GYNECOLOGY

## 2022-02-08 ENCOUNTER — LAB VISIT (OUTPATIENT)
Dept: LAB | Facility: HOSPITAL | Age: 27
End: 2022-02-08
Attending: OBSTETRICS & GYNECOLOGY
Payer: COMMERCIAL

## 2022-02-08 ENCOUNTER — OFFICE VISIT (OUTPATIENT)
Dept: OBSTETRICS AND GYNECOLOGY | Facility: CLINIC | Age: 27
End: 2022-02-08
Payer: COMMERCIAL

## 2022-02-08 VITALS
SYSTOLIC BLOOD PRESSURE: 108 MMHG | BODY MASS INDEX: 23.85 KG/M2 | DIASTOLIC BLOOD PRESSURE: 64 MMHG | HEIGHT: 62 IN | WEIGHT: 129.63 LBS

## 2022-02-08 DIAGNOSIS — O00.102 LEFT TUBAL PREGNANCY WITHOUT INTRAUTERINE PREGNANCY: Primary | ICD-10-CM

## 2022-02-08 DIAGNOSIS — O00.90 ECTOPIC PREGNANCY, UNSPECIFIED LOCATION, UNSPECIFIED WHETHER INTRAUTERINE PREGNANCY PRESENT: ICD-10-CM

## 2022-02-08 LAB — HCG INTACT+B SERPL-ACNC: 2009 MIU/ML

## 2022-02-08 PROCEDURE — 3078F DIAST BP <80 MM HG: CPT | Mod: CPTII,S$GLB,, | Performed by: OBSTETRICS & GYNECOLOGY

## 2022-02-08 PROCEDURE — 84702 CHORIONIC GONADOTROPIN TEST: CPT | Performed by: OBSTETRICS & GYNECOLOGY

## 2022-02-08 PROCEDURE — 99999 PR PBB SHADOW E&M-EST. PATIENT-LVL III: ICD-10-PCS | Mod: PBBFAC,,, | Performed by: OBSTETRICS & GYNECOLOGY

## 2022-02-08 PROCEDURE — 99213 OFFICE O/P EST LOW 20 MIN: CPT | Mod: S$GLB,,, | Performed by: OBSTETRICS & GYNECOLOGY

## 2022-02-08 PROCEDURE — 36415 COLL VENOUS BLD VENIPUNCTURE: CPT | Mod: PN | Performed by: OBSTETRICS & GYNECOLOGY

## 2022-02-08 PROCEDURE — 1160F PR REVIEW ALL MEDS BY PRESCRIBER/CLIN PHARMACIST DOCUMENTED: ICD-10-PCS | Mod: CPTII,S$GLB,, | Performed by: OBSTETRICS & GYNECOLOGY

## 2022-02-08 PROCEDURE — 99999 PR PBB SHADOW E&M-EST. PATIENT-LVL III: CPT | Mod: PBBFAC,,, | Performed by: OBSTETRICS & GYNECOLOGY

## 2022-02-08 PROCEDURE — 1160F RVW MEDS BY RX/DR IN RCRD: CPT | Mod: CPTII,S$GLB,, | Performed by: OBSTETRICS & GYNECOLOGY

## 2022-02-08 PROCEDURE — 99213 PR OFFICE/OUTPT VISIT, EST, LEVL III, 20-29 MIN: ICD-10-PCS | Mod: S$GLB,,, | Performed by: OBSTETRICS & GYNECOLOGY

## 2022-02-08 PROCEDURE — 1159F PR MEDICATION LIST DOCUMENTED IN MEDICAL RECORD: ICD-10-PCS | Mod: CPTII,S$GLB,, | Performed by: OBSTETRICS & GYNECOLOGY

## 2022-02-08 PROCEDURE — 1159F MED LIST DOCD IN RCRD: CPT | Mod: CPTII,S$GLB,, | Performed by: OBSTETRICS & GYNECOLOGY

## 2022-02-08 PROCEDURE — 3078F PR MOST RECENT DIASTOLIC BLOOD PRESSURE < 80 MM HG: ICD-10-PCS | Mod: CPTII,S$GLB,, | Performed by: OBSTETRICS & GYNECOLOGY

## 2022-02-08 PROCEDURE — 3008F BODY MASS INDEX DOCD: CPT | Mod: CPTII,S$GLB,, | Performed by: OBSTETRICS & GYNECOLOGY

## 2022-02-08 PROCEDURE — 3008F PR BODY MASS INDEX (BMI) DOCUMENTED: ICD-10-PCS | Mod: CPTII,S$GLB,, | Performed by: OBSTETRICS & GYNECOLOGY

## 2022-02-08 PROCEDURE — 3074F SYST BP LT 130 MM HG: CPT | Mod: CPTII,S$GLB,, | Performed by: OBSTETRICS & GYNECOLOGY

## 2022-02-08 PROCEDURE — 3074F PR MOST RECENT SYSTOLIC BLOOD PRESSURE < 130 MM HG: ICD-10-PCS | Mod: CPTII,S$GLB,, | Performed by: OBSTETRICS & GYNECOLOGY

## 2022-02-08 NOTE — PROGRESS NOTES
Chief Complaint   Patient presents with    Follow up ectopic       HPI:   26 y.o.  here today for follow up of ectopic pregnancy, s/p two doses of MTX due to inappropriate decrease after the first dose, initially given on , second dose given on . Today she reports mild vaginal bleeding which has decreased significantly since her ER visit on 22, at which time she complained of abdominal pain/cramping and heavy bleeding. US did not show evidence of ruptured ectopic at that time. Overall she is doing well today with no new complaints or concerns.      LMP Dates from Last 1 Encounters:   LMP: 2021     HCG (): 2728 --> (): 2411 --> (): Pending  TVUS (22): Normal uterus, EMS 7 mm, no intrauterine gestational sac. ROV wnl. Medial to LOV is a 2.4x1.6x1.5 cm heterogeneous but predominantly hyperechoic structure without internal vascularity, suspected ectopic. Trace free fluid within the pelvis    Labs / Significant Studies    Pap: Needs    Past Medical History:   Diagnosis Date    Abnormal Pap smear of cervix      Past Surgical History:   Procedure Laterality Date    ELBOW SURGERY Left 2018    SPLENECTOMY, PARTIAL N/A 2018       Current Outpatient Medications:     HYDROcodone-acetaminophen (NORCO) 7.5-325 mg per tablet, Take 1 tablet by mouth every 6 (six) hours as needed for Pain., Disp: 12 tablet, Rfl: 0    ondansetron (ZOFRAN) 4 MG tablet, Take 1 tablet (4 mg total) by mouth every 6 (six) hours as needed for Nausea., Disp: 30 tablet, Rfl: 1    prenatal vit-iron fum-folic ac 27 mg iron- 0.8 mg Tab, Take 1 tablet by mouth once daily., Disp: 30 tablet, Rfl: 11  Review of patient's allergies indicates:  No Known Allergies  OB History    Para Term  AB Living   4       3 0   SAB IAB Ectopic Multiple Live Births   3              # Outcome Date GA Lbr John/2nd Weight Sex Delivery Anes PTL Lv   4 Current            3 2020           2 2019           1 2018              Social History     Tobacco Use    Smoking status: Never Smoker    Smokeless tobacco: Never Used   Substance Use Topics    Alcohol use: Yes    Drug use: Yes     Types: Marijuana     Family History   Problem Relation Age of Onset    Breast cancer Neg Hx     Colon cancer Neg Hx     Ovarian cancer Neg Hx        Review of Systems   Negative except as in HPI      Physical Exam   Vitals:    02/08/22 1317   BP: 108/64     Body mass index is 23.71 kg/m².    Physical Exam  Constitutional:       General: She is not in acute distress.     Appearance: Normal appearance.   HENT:      Head: Normocephalic and atraumatic.   Pulmonary:      Effort: Pulmonary effort is normal.   Abdominal:      General: Abdomen is flat. There is no distension.      Palpations: Abdomen is soft.      Tenderness: There is no abdominal tenderness. There is no guarding or rebound.   Musculoskeletal:         General: Normal range of motion.      Cervical back: Normal range of motion.   Neurological:      General: No focal deficit present.      Mental Status: She is alert and oriented to person, place, and time.   Skin:     General: Skin is warm and dry.   Psychiatric:         Mood and Affect: Mood normal.         Behavior: Behavior normal.         Thought Content: Thought content normal.        Labs reviewed: hCG x3, TVUS    ASSESSMENT:   Patient Active Problem List   Diagnosis    Ectopic pregnancy       PLAN:  Problem List Items Addressed This Visit        Obstetric    Ectopic pregnancy - Primary    Current Assessment & Plan     S/p two doses of MTX. Unable to determine if appropriate decrease in hCG from Day 4 to Day 7 as day 7 labs are still pending. Will follow up, and if appropriate will trend hCG weekly to zero. Ectopic precautions reviewed. Discussed effective methods of contraception after MTX and avoiding conception for at least 3 months due to risks of birth defects, patient desires NuvaRing.                 Total time spent on  this encounter was 20 minutes.  This includes preparing to see the patient;  obtaining/reviewing separately obtained history;  performing a medical exam and/or evaluation;   counseling/educating the patient;  ordering medications, tests, of procedures;   referring/communicating with other health care professionals;  EMR documentation;  interpreting/communicating results to the patient;  and/or care coordination.    Follow up in about 4 weeks (around 3/8/2022) for GYN follow up.       Jodi Sheehan MD  Department of Obstetrics & Gynecology  [facility]

## 2022-02-08 NOTE — ASSESSMENT & PLAN NOTE
S/p two doses of MTX. Unable to determine if appropriate decrease in hCG from Day 4 to Day 7 as day 7 labs are still pending. Will follow up, and if appropriate will trend hCG weekly to zero. Ectopic precautions reviewed. Discussed effective methods of contraception after MTX and avoiding conception for at least 3 months due to risks of birth defects, patient desires NuvaRing.

## 2022-02-09 ENCOUNTER — PATIENT MESSAGE (OUTPATIENT)
Dept: OBSTETRICS AND GYNECOLOGY | Facility: CLINIC | Age: 27
End: 2022-02-09
Payer: COMMERCIAL

## 2022-02-09 ENCOUNTER — CLINICAL SUPPORT (OUTPATIENT)
Dept: OBSTETRICS AND GYNECOLOGY | Facility: CLINIC | Age: 27
End: 2022-02-09
Payer: COMMERCIAL

## 2022-02-09 VITALS — HEIGHT: 62 IN | BODY MASS INDEX: 23.74 KG/M2 | WEIGHT: 129 LBS

## 2022-02-09 DIAGNOSIS — O00.102 LEFT TUBAL PREGNANCY WITHOUT INTRAUTERINE PREGNANCY: ICD-10-CM

## 2022-02-09 DIAGNOSIS — N30.01 ACUTE CYSTITIS WITH HEMATURIA: Primary | ICD-10-CM

## 2022-02-09 PROCEDURE — 99999 PR PBB SHADOW E&M-EST. PATIENT-LVL II: ICD-10-PCS | Mod: PBBFAC,,, | Performed by: OBSTETRICS & GYNECOLOGY

## 2022-02-09 PROCEDURE — 87086 URINE CULTURE/COLONY COUNT: CPT | Performed by: OBSTETRICS & GYNECOLOGY

## 2022-02-09 PROCEDURE — 99499 NO LOS: ICD-10-PCS | Mod: S$GLB,,, | Performed by: OBSTETRICS & GYNECOLOGY

## 2022-02-09 PROCEDURE — 99499 UNLISTED E&M SERVICE: CPT | Mod: S$GLB,,, | Performed by: OBSTETRICS & GYNECOLOGY

## 2022-02-09 PROCEDURE — 99999 PR PBB SHADOW E&M-EST. PATIENT-LVL II: CPT | Mod: PBBFAC,,, | Performed by: OBSTETRICS & GYNECOLOGY

## 2022-02-09 NOTE — TELEPHONE ENCOUNTER
Patient with back pain and hematuria. Will order urine culture but probable side effect from MTX. Creatinine 0.8 prior to second dose. Encouraged increased PO hydration. HCG decreased 16.7%, successful MTX treatment. Will monitor levels weekly until zero. Patient aware, all questions answered. Will schedule lab appointments.

## 2022-02-11 LAB — BACTERIA UR CULT: NORMAL

## 2022-02-12 ENCOUNTER — PATIENT MESSAGE (OUTPATIENT)
Dept: OBSTETRICS AND GYNECOLOGY | Facility: CLINIC | Age: 27
End: 2022-02-12
Payer: COMMERCIAL

## 2022-02-12 DIAGNOSIS — O00.102 LEFT TUBAL PREGNANCY WITHOUT INTRAUTERINE PREGNANCY: Primary | ICD-10-CM

## 2022-02-12 RX ORDER — KETOROLAC TROMETHAMINE 10 MG/1
10 TABLET, FILM COATED ORAL EVERY 6 HOURS PRN
Qty: 20 TABLET | Refills: 0 | Status: SHIPPED | OUTPATIENT
Start: 2022-02-12 | End: 2022-02-14 | Stop reason: SDUPTHER

## 2022-02-13 ENCOUNTER — HOSPITAL ENCOUNTER (EMERGENCY)
Facility: OTHER | Age: 27
Discharge: HOME | End: 2022-02-13
Attending: EMERGENCY MEDICINE
Payer: COMMERCIAL

## 2022-02-13 VITALS
OXYGEN SATURATION: 97 % | WEIGHT: 126 LBS | TEMPERATURE: 98 F | DIASTOLIC BLOOD PRESSURE: 63 MMHG | HEART RATE: 78 BPM | RESPIRATION RATE: 18 BRPM | BODY MASS INDEX: 23.05 KG/M2 | SYSTOLIC BLOOD PRESSURE: 102 MMHG

## 2022-02-13 DIAGNOSIS — N12 PYELONEPHRITIS: Primary | ICD-10-CM

## 2022-02-13 DIAGNOSIS — R07.9 CHEST PAIN: ICD-10-CM

## 2022-02-13 DIAGNOSIS — R10.9 ABDOMINAL PAIN: ICD-10-CM

## 2022-02-13 DIAGNOSIS — O00.90 ECTOPIC PREGNANCY, UNSPECIFIED LOCATION, UNSPECIFIED WHETHER INTRAUTERINE PREGNANCY PRESENT: ICD-10-CM

## 2022-02-13 LAB
ALBUMIN SERPL BCP-MCNC: 4 G/DL (ref 3.5–5.2)
ALP SERPL-CCNC: 66 U/L (ref 55–135)
ALT SERPL W/O P-5'-P-CCNC: 40 U/L (ref 10–44)
ANION GAP SERPL CALC-SCNC: 10 MMOL/L (ref 8–16)
AST SERPL-CCNC: 42 U/L (ref 10–40)
B-HCG UR QL: POSITIVE
BACTERIA #/AREA URNS HPF: ABNORMAL /HPF
BASOPHILS # BLD AUTO: 0.03 K/UL (ref 0–0.2)
BASOPHILS NFR BLD: 0.8 % (ref 0–1.9)
BILIRUB SERPL-MCNC: 0.3 MG/DL (ref 0.1–1)
BILIRUB UR QL STRIP: NEGATIVE
BUN SERPL-MCNC: 9 MG/DL (ref 6–20)
CALCIUM SERPL-MCNC: 9.4 MG/DL (ref 8.7–10.5)
CHLORIDE SERPL-SCNC: 106 MMOL/L (ref 95–110)
CLARITY UR: CLEAR
CO2 SERPL-SCNC: 23 MMOL/L (ref 23–29)
COLOR UR: YELLOW
CREAT SERPL-MCNC: 0.9 MG/DL (ref 0.5–1.4)
CTP QC/QA: YES
DIFFERENTIAL METHOD: ABNORMAL
EOSINOPHIL # BLD AUTO: 0.2 K/UL (ref 0–0.5)
EOSINOPHIL NFR BLD: 4.1 % (ref 0–8)
ERYTHROCYTE [DISTWIDTH] IN BLOOD BY AUTOMATED COUNT: 11.9 % (ref 11.5–14.5)
EST. GFR  (AFRICAN AMERICAN): >60 ML/MIN/1.73 M^2
EST. GFR  (NON AFRICAN AMERICAN): >60 ML/MIN/1.73 M^2
GLUCOSE SERPL-MCNC: 79 MG/DL (ref 70–110)
GLUCOSE UR QL STRIP: NEGATIVE
HCG INTACT+B SERPL-ACNC: 1117 MIU/ML
HCT VFR BLD AUTO: 33.4 % (ref 37–48.5)
HGB BLD-MCNC: 11.5 G/DL (ref 12–16)
HGB UR QL STRIP: ABNORMAL
HYALINE CASTS #/AREA URNS LPF: 2 /LPF
IMM GRANULOCYTES # BLD AUTO: 0.01 K/UL (ref 0–0.04)
IMM GRANULOCYTES NFR BLD AUTO: 0.3 % (ref 0–0.5)
KETONES UR QL STRIP: ABNORMAL
LEUKOCYTE ESTERASE UR QL STRIP: ABNORMAL
LIPASE SERPL-CCNC: 67 U/L (ref 4–60)
LYMPHOCYTES # BLD AUTO: 1.6 K/UL (ref 1–4.8)
LYMPHOCYTES NFR BLD: 40.1 % (ref 18–48)
MCH RBC QN AUTO: 30.1 PG (ref 27–31)
MCHC RBC AUTO-ENTMCNC: 34.4 G/DL (ref 32–36)
MCV RBC AUTO: 87 FL (ref 82–98)
MICROSCOPIC COMMENT: ABNORMAL
MONOCYTES # BLD AUTO: 0.7 K/UL (ref 0.3–1)
MONOCYTES NFR BLD: 17.8 % (ref 4–15)
NEUTROPHILS # BLD AUTO: 1.4 K/UL (ref 1.8–7.7)
NEUTROPHILS NFR BLD: 36.9 % (ref 38–73)
NITRITE UR QL STRIP: POSITIVE
NON-SQ EPI CELLS #/AREA URNS HPF: 0 /HPF
NRBC BLD-RTO: 0 /100 WBC
PH UR STRIP: 7 [PH] (ref 5–8)
PLATELET # BLD AUTO: 313 K/UL (ref 150–450)
PMV BLD AUTO: 8.3 FL (ref 9.2–12.9)
POTASSIUM SERPL-SCNC: 3.9 MMOL/L (ref 3.5–5.1)
PROT SERPL-MCNC: 7.3 G/DL (ref 6–8.4)
PROT UR QL STRIP: ABNORMAL
RBC # BLD AUTO: 3.82 M/UL (ref 4–5.4)
RBC #/AREA URNS HPF: 70 /HPF (ref 0–4)
SODIUM SERPL-SCNC: 139 MMOL/L (ref 136–145)
SP GR UR STRIP: 1.01 (ref 1–1.03)
SQUAMOUS #/AREA URNS HPF: 2 /HPF
URN SPEC COLLECT METH UR: ABNORMAL
UROBILINOGEN UR STRIP-ACNC: 1 EU/DL
WBC # BLD AUTO: 3.87 K/UL (ref 3.9–12.7)
WBC #/AREA URNS HPF: 15 /HPF (ref 0–5)
WBC CLUMPS URNS QL MICRO: ABNORMAL
YEAST URNS QL MICRO: ABNORMAL

## 2022-02-13 PROCEDURE — 84702 CHORIONIC GONADOTROPIN TEST: CPT | Performed by: NURSE PRACTITIONER

## 2022-02-13 PROCEDURE — 80053 COMPREHEN METABOLIC PANEL: CPT | Performed by: NURSE PRACTITIONER

## 2022-02-13 PROCEDURE — 85025 COMPLETE CBC W/AUTO DIFF WBC: CPT | Performed by: NURSE PRACTITIONER

## 2022-02-13 PROCEDURE — 96375 TX/PRO/DX INJ NEW DRUG ADDON: CPT

## 2022-02-13 PROCEDURE — 93010 EKG 12-LEAD: ICD-10-PCS | Mod: ,,, | Performed by: INTERNAL MEDICINE

## 2022-02-13 PROCEDURE — 96361 HYDRATE IV INFUSION ADD-ON: CPT

## 2022-02-13 PROCEDURE — 25000003 PHARM REV CODE 250: Performed by: NURSE PRACTITIONER

## 2022-02-13 PROCEDURE — 63600175 PHARM REV CODE 636 W HCPCS: Performed by: PHYSICIAN ASSISTANT

## 2022-02-13 PROCEDURE — 99283 EMERGENCY DEPT VISIT LOW MDM: CPT | Mod: ,,, | Performed by: OBSTETRICS & GYNECOLOGY

## 2022-02-13 PROCEDURE — 83690 ASSAY OF LIPASE: CPT | Performed by: NURSE PRACTITIONER

## 2022-02-13 PROCEDURE — 63600175 PHARM REV CODE 636 W HCPCS: Performed by: NURSE PRACTITIONER

## 2022-02-13 PROCEDURE — 99283 PR EMERGENCY DEPT VISIT,LEVEL III: ICD-10-PCS | Mod: ,,, | Performed by: OBSTETRICS & GYNECOLOGY

## 2022-02-13 PROCEDURE — 96365 THER/PROPH/DIAG IV INF INIT: CPT

## 2022-02-13 PROCEDURE — 99285 EMERGENCY DEPT VISIT HI MDM: CPT | Mod: 25

## 2022-02-13 PROCEDURE — 87086 URINE CULTURE/COLONY COUNT: CPT | Performed by: EMERGENCY MEDICINE

## 2022-02-13 PROCEDURE — 81000 URINALYSIS NONAUTO W/SCOPE: CPT | Performed by: EMERGENCY MEDICINE

## 2022-02-13 PROCEDURE — 93010 ELECTROCARDIOGRAM REPORT: CPT | Mod: ,,, | Performed by: INTERNAL MEDICINE

## 2022-02-13 PROCEDURE — 81025 URINE PREGNANCY TEST: CPT | Performed by: EMERGENCY MEDICINE

## 2022-02-13 PROCEDURE — 93005 ELECTROCARDIOGRAM TRACING: CPT

## 2022-02-13 RX ORDER — NITROFURANTOIN 25; 75 MG/1; MG/1
100 CAPSULE ORAL 2 TIMES DAILY
Qty: 10 CAPSULE | Refills: 0 | Status: SHIPPED | OUTPATIENT
Start: 2022-02-13 | End: 2022-02-18

## 2022-02-13 RX ORDER — ONDANSETRON 2 MG/ML
4 INJECTION INTRAMUSCULAR; INTRAVENOUS ONCE
Status: COMPLETED | OUTPATIENT
Start: 2022-02-13 | End: 2022-02-13

## 2022-02-13 RX ORDER — MORPHINE SULFATE 4 MG/ML
4 INJECTION, SOLUTION INTRAMUSCULAR; INTRAVENOUS ONCE
Status: COMPLETED | OUTPATIENT
Start: 2022-02-13 | End: 2022-02-13

## 2022-02-13 RX ADMIN — SODIUM CHLORIDE 1000 ML: 0.9 INJECTION, SOLUTION INTRAVENOUS at 10:02

## 2022-02-13 RX ADMIN — ONDANSETRON 4 MG: 2 INJECTION INTRAMUSCULAR; INTRAVENOUS at 10:02

## 2022-02-13 RX ADMIN — CEFTRIAXONE 1 G: 1 INJECTION, SOLUTION INTRAVENOUS at 11:02

## 2022-02-13 RX ADMIN — MORPHINE SULFATE 4 MG: 4 INJECTION, SOLUTION INTRAMUSCULAR; INTRAVENOUS at 10:02

## 2022-02-13 RX ADMIN — LORAZEPAM 1 MG: 2 INJECTION INTRAMUSCULAR; INTRAVENOUS at 10:02

## 2022-02-13 NOTE — ED PROVIDER NOTES
Source of History:  Patient     Chief complaint:  Abdominal Pain (LLQ abdominal pain x 2 weeks; rx and otc pain medications not working.  Recently dx w/ ectopic pregnancy and received methotrexate.  Pt endorses having blood in urine.  Denies vaginal bleeding.  OB advised pt to come to ED for kidney stone rule out. )      HPI:  Karlene Sweeney is a 26 y.o. female presenting to the emergency department with complaint of left lower abdominal pain x2 weeks which worsened this morning.  Patient was recently diagnosed with an ectopic pregnancy and was treated with methotrexate x2 doses.  Reports that her beta-hCG level is trending down words.  Patient also endorses some left lower back pain, She also reports hematuria.  She denies any vaginal bleeding at this time.    This is the extent to the patients complaints today here in the emergency department.    ROS: As per HPI and below:  General: No fever.  No chills.  ENT: No sore throat. No ear pain  Chest: No shortness of breath. No cough.    Cardiovascular: No chest pain.   Abdomen:  Abdominal pain  Genito-Urinary:  Hematuria  Neurologic: No focal weakness.  No numbness.  No headache  MSK:  Lower back pain  Integument: No rashes or lesions.    Review of patient's allergies indicates:  No Known Allergies    PMH:  As per HPI and below:  Past Medical History:   Diagnosis Date    Abnormal Pap smear of cervix 2014     Past Surgical History:   Procedure Laterality Date    ELBOW SURGERY Left 2018    SPLENECTOMY, PARTIAL N/A 2018       Social History     Tobacco Use    Smoking status: Never Smoker    Smokeless tobacco: Never Used   Substance Use Topics    Alcohol use: Yes    Drug use: Yes     Types: Marijuana       Physical Exam:    /63 (BP Location: Right arm, Patient Position: Lying)   Pulse 78   Temp 98.4 °F (36.9 °C) (Oral)   Resp 18   Wt 57.2 kg (126 lb)   LMP 12/11/2021   SpO2 97%   BMI 23.05 kg/m²   Vitals:    02/13/22 1113 02/13/22 1201 02/13/22 1238  02/13/22 1332   BP:    106/68   Pulse: 89 78 76 84   Resp: 14 15  20   Temp:   97.1 °F (36.2 °C)    TempSrc:   Oral    SpO2: 100% 100% 100% 100%   Weight:        02/13/22 1441   BP: 102/63   Pulse: 78   Resp: 18   Temp: 98.4 °F (36.9 °C)   TempSrc: Oral   SpO2: 97%   Weight:        Nursing note and vital signs reviewed.  Appearance: distress.  Eyes: No conjunctival injection.  Extraocular muscles are intact.  Chest/ Respiratory: Clear to auscultation bilaterally.  Good air movement.  No wheezes.  No rhonchi. No rales. No accessory muscle use.  Cardiovascular: Regular rate and rhythm.  No murmurs. No gallops. No rubs.  Abdomen: Soft.  Tender to palpation to the left lower abdomen, patient also has CVA tenderness on exam.  Musculoskeletal: Good range of motion all joints.  No deformities.  Neck supple.  No meningismus.  Skin: No rashes seen.  Good turgor.  No abrasions.  No ecchymoses.  Neuro: alert and oriented x3,  no focal neurological deficits.  Psych: Appropriate, conversant    Labs that have been ordered have been independently reviewed and interpreted by myself.  Labs Reviewed   URINALYSIS, REFLEX TO URINE CULTURE - Abnormal; Notable for the following components:       Result Value    Protein, UA 2+ (*)     Ketones, UA Trace (*)     Occult Blood UA 3+ (*)     Nitrite, UA Positive (*)     Leukocytes, UA Trace (*)     All other components within normal limits    Narrative:     Specimen Source->Urine   CBC W/ AUTO DIFFERENTIAL - Abnormal; Notable for the following components:    WBC 3.87 (*)     RBC 3.82 (*)     Hemoglobin 11.5 (*)     Hematocrit 33.4 (*)     MPV 8.3 (*)     Gran # (ANC) 1.4 (*)     Gran % 36.9 (*)     Mono % 17.8 (*)     All other components within normal limits    Narrative:     Release to patient->Immediate   COMPREHENSIVE METABOLIC PANEL - Abnormal; Notable for the following components:    AST 42 (*)     All other components within normal limits    Narrative:     Release to patient->Immediate    URINALYSIS MICROSCOPIC - Abnormal; Notable for the following components:    RBC, UA 70 (*)     WBC, UA 15 (*)     WBC Clumps, UA Occasional (*)     Hyaline Casts, UA 2 (*)     All other components within normal limits    Narrative:     Specimen Source->Urine   LIPASE - Abnormal; Notable for the following components:    Lipase 67 (*)     All other components within normal limits    Narrative:     Release to patient->Immediate   POCT URINE PREGNANCY - Abnormal; Notable for the following components:    POC Preg Test, Ur Positive (*)     All other components within normal limits   CULTURE, URINE   HCG, QUANTITATIVE    Narrative:     Release to patient->Immediate       CT Renal Stone Study ABD Pelvis WO   Final Result      1. Heterogeneous tubular structure in the left adnexa, likely related to the patient's known ectopic pregnancy.  No evidence of free fluid or hemorrhage in the pelvis to suggest gross rupture.   2. No hydronephrosis or nephrolithiasis.         Electronically signed by: Les Martin   Date:    02/13/2022   Time:    14:24      US OB 14+ Wks, TransAbd, Single Gestation   Final Result      Exam is essentially nondiagnostic in the setting of a known ectopic pregnancy as the patient deferred transvaginal imaging.  I see no significant free fluid.      Measured abnormality in the left adnexa is nonspecific and could represent a hematosalpinx in the setting of ectopic pregnancy.  It is possible this represents a bowel loop.  Please correlate with exam and laboratory values.         Electronically signed by: Caprice Bautista   Date:    02/13/2022   Time:    12:05      X-Ray Chest AP Portable   Final Result      1. No acute radiographic findings in the chest on this single view.         Electronically signed by: Wisam Little MD   Date:    02/13/2022   Time:    10:52                Initial Impression/ Differential Dx:  Diverticulitis, ectopic pregnancy, pyelo, UTI, acute abdomen, ruptured ectopic      MDM:     26 y.o. female with left lower abdominal pain x2 weeks worse over the past day.  Patient history of ectopic pregnancy with treatment with methotrexate x2.  Due to persistent pain on the left side ultrasound was obtained which did reveal a persistent ectopic however the ACE level is trending downward.  Patient was evaluated by OBGYN they recommended patient be discharged home with follow-up with outpatient clinic they do not believe that this is an acute abdomen or ruptured ectopic at this time.  Patient's UA was positive for white blood cells, leukocytes and nitrates she was treated with Rocephin in the emergency department.  CT renal stone was obtained to rule out infected kidney stone.  Will discharge patient home with strict return precautions including development of worsening abdominal pain, vaginal bleeding, dizziness, lightheadedness fever or any other concerns she may return to emergency department for re-evaluation.  She will be discharged home with Macrobid.    ED Course as of 02/13/22 2127   Sun Feb 13, 2022   1003 Preg Test, Ur(!): Positive [AS]   1008 Preg Test, Ur(!): Positive  Patient with known ectopic, treated twice with methotrexate.  With persistent LLQ pain, concerning for persistent ectopic.  Will order US [AS]   1038 WBC, UA(!): 15 [AS]   1038 Leukocytes, UA(!): Trace [AS]   1038 NITRITE UA(!): Positive [AS]   1043 I was asked to come into the room by nurse as patient appeared in moderate distress after receiving morphine.  She is complaining of epigastric pain and chest pain.  EKG reveals sinus tachycardia rate of 103 with rightward axis; no STEMI.  VSS at that time.  Hard to appreciate were pain was worse she was reported to generalized area.  She did appear to have some hyperventilation.  Ativan was ordered.  Chest x-ray ordered to assess for any acute intrathoracic process and or free air in the abdomen. [LC]   1044 Urine results do reveal nitrite positive UTI.  Ceftriaxone ordered. [LC]    1101 HCG Quant: 1117 [AS]      ED Course User Index  [AS] MANUEL Celaya  [LC] RADHA Frazier               Diagnostic Impression:    1. Pyelonephritis    2. Abdominal pain    3. Chest pain    4. Ectopic pregnancy, unspecified location, unspecified whether intrauterine pregnancy present         ED Disposition Condition    Discharge Stable          ED Prescriptions     Medication Sig Dispense Start Date End Date Auth. Provider    nitrofurantoin, macrocrystal-monohydrate, (MACROBID) 100 MG capsule Take 1 capsule (100 mg total) by mouth 2 (two) times daily. for 5 days 10 capsule 2/13/2022 2/18/2022 MANUEL Celaya        Follow-up Information     Follow up With Specialties Details Why Contact Info    [facility] - Emergency Dept Emergency Medicine Go to  If symptoms worsen [address] [facility] [id number] [phone]    OBGYN  Call in 1 day            ED Prescriptions     Medication Sig Dispense Start Date End Date Auth. Provider    nitrofurantoin, macrocrystal-monohydrate, (MACROBID) 100 MG capsule Take 1 capsule (100 mg total) by mouth 2 (two) times daily. for 5 days 10 capsule 2/13/2022 2/18/2022 MANUEL Celaya FNP  02/13/22 2126
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
## 2022-02-13 NOTE — ED TRIAGE NOTES
Chief Complaint   Patient presents with    Abdominal Pain     LLQ abdominal pain x 2 weeks; rx and otc pain medications not working.  Recently dx w/ ectopic pregnancy and received methotrexate.  Pt not having blood in urine.  Denies vaginal bleeding.  OB advised pt to come to ED for kidney stone rule out.      Pt presents to the ED for evaluation of LLQ abdominal pain x several weeks.  Recently received methotrexate to treat ectopic pregnancy.  Endorsed blood in urine and LLQ abdominal pain unrelieved by any of the rx pain medications prescribed.  Advised to come to the ED for kidney stone rule out.  Pt appears mildly distressed d/t the pain.  Pt queried; pt denies further complaints at this time.

## 2022-02-13 NOTE — ED NOTES
RN called to room by pt.  Pt c/o upper epigastric pain w/ sudden onset.  Pt appears more distressed vs original ED appearance.  VSS.  NP notified.

## 2022-02-13 NOTE — ED NOTES
"Pt now c/o "crushing" CP and SOB.  Appears very restless and agitated on stretcher.  SPO2 100%; -110; pt tachypneic.  EKG and cardiac monitoring initiated.  Pt placed on 2L NC and advised to attempt to slow breathing.  Rates pain 10/10.  Paula GARCIA notified and will come to BS for evaluation.  "

## 2022-02-14 DIAGNOSIS — R10.2 PELVIC PAIN: Primary | ICD-10-CM

## 2022-02-14 DIAGNOSIS — O00.90 ECTOPIC PREGNANCY, UNSPECIFIED LOCATION, UNSPECIFIED WHETHER INTRAUTERINE PREGNANCY PRESENT: ICD-10-CM

## 2022-02-14 RX ORDER — KETOROLAC TROMETHAMINE 10 MG/1
10 TABLET, FILM COATED ORAL EVERY 6 HOURS PRN
Qty: 20 TABLET | Refills: 0 | Status: SHIPPED | OUTPATIENT
Start: 2022-02-14 | End: 2022-02-19

## 2022-02-15 LAB — BACTERIA UR CULT: NO GROWTH

## 2022-02-21 ENCOUNTER — TELEPHONE (OUTPATIENT)
Dept: OBSTETRICS AND GYNECOLOGY | Facility: CLINIC | Age: 27
End: 2022-02-21
Payer: COMMERCIAL

## 2022-02-21 ENCOUNTER — LAB VISIT (OUTPATIENT)
Dept: LAB | Facility: OTHER | Age: 27
End: 2022-02-21
Attending: OBSTETRICS & GYNECOLOGY
Payer: COMMERCIAL

## 2022-02-21 DIAGNOSIS — O00.102 LEFT TUBAL PREGNANCY WITHOUT INTRAUTERINE PREGNANCY: ICD-10-CM

## 2022-02-21 LAB — HCG INTACT+B SERPL-ACNC: 229 MIU/ML

## 2022-02-21 PROCEDURE — 36415 COLL VENOUS BLD VENIPUNCTURE: CPT | Performed by: OBSTETRICS & GYNECOLOGY

## 2022-02-21 PROCEDURE — 84702 CHORIONIC GONADOTROPIN TEST: CPT | Performed by: OBSTETRICS & GYNECOLOGY

## 2022-02-21 NOTE — TELEPHONE ENCOUNTER
Called pt to inform her that Dr. Sheehan wants her to get her beta hcg levels checked today, and again once weekly until her levels are zero. Pt verbalized understanding

## 2022-02-21 NOTE — TELEPHONE ENCOUNTER
Will need weekly hCG labs, standing order placed today and will route message to my assistant for scheduling. Patient reports improved pain and bleeding.

## 2022-02-28 ENCOUNTER — TELEPHONE (OUTPATIENT)
Dept: OBSTETRICS AND GYNECOLOGY | Facility: CLINIC | Age: 27
End: 2022-02-28
Payer: COMMERCIAL

## 2022-02-28 NOTE — TELEPHONE ENCOUNTER
----- Message from Jodi Sheehan MD sent at 2/28/2022 10:09 AM CST -----  Regarding: Lab appointment  Can you please call patient and remind her to come in for her beta hCG today? She should have a standing order. We need to perform labs weekly until they are negative. Thanks!

## 2022-03-02 ENCOUNTER — OFFICE VISIT (OUTPATIENT)
Dept: URGENT CARE | Facility: CLINIC | Age: 27
End: 2022-03-02
Payer: COMMERCIAL

## 2022-03-02 ENCOUNTER — TELEPHONE (OUTPATIENT)
Dept: OBSTETRICS AND GYNECOLOGY | Facility: CLINIC | Age: 27
End: 2022-03-02
Payer: COMMERCIAL

## 2022-03-02 VITALS
HEIGHT: 62 IN | DIASTOLIC BLOOD PRESSURE: 79 MMHG | TEMPERATURE: 98 F | HEART RATE: 87 BPM | RESPIRATION RATE: 18 BRPM | SYSTOLIC BLOOD PRESSURE: 128 MMHG | OXYGEN SATURATION: 98 % | BODY MASS INDEX: 23.19 KG/M2 | WEIGHT: 126 LBS

## 2022-03-02 DIAGNOSIS — Z32.01 POSITIVE PREGNANCY TEST: ICD-10-CM

## 2022-03-02 DIAGNOSIS — R30.0 DYSURIA: ICD-10-CM

## 2022-03-02 DIAGNOSIS — O00.90 ECTOPIC PREGNANCY, UNSPECIFIED LOCATION, UNSPECIFIED WHETHER INTRAUTERINE PREGNANCY PRESENT: Primary | ICD-10-CM

## 2022-03-02 LAB
B-HCG UR QL: POSITIVE
BILIRUB UR QL STRIP: NEGATIVE
CTP QC/QA: YES
GLUCOSE UR QL STRIP: NEGATIVE
KETONES UR QL STRIP: NEGATIVE
LEUKOCYTE ESTERASE UR QL STRIP: NEGATIVE
PH, POC UA: 5.5 (ref 5–8)
POC BLOOD, URINE: NEGATIVE
POC NITRATES, URINE: NEGATIVE
PROT UR QL STRIP: NEGATIVE
SP GR UR STRIP: 1.02 (ref 1–1.03)
UROBILINOGEN UR STRIP-ACNC: NORMAL (ref 0.1–1.1)

## 2022-03-02 PROCEDURE — 81025 URINE PREGNANCY TEST: CPT | Mod: TIER,S$GLB,, | Performed by: NURSE PRACTITIONER

## 2022-03-02 PROCEDURE — 81003 POCT URINALYSIS, DIPSTICK, AUTOMATED, W/O SCOPE: ICD-10-PCS | Mod: QW,TIER,S$GLB, | Performed by: NURSE PRACTITIONER

## 2022-03-02 PROCEDURE — 81025 POCT URINE PREGNANCY: ICD-10-PCS | Mod: TIER,S$GLB,, | Performed by: NURSE PRACTITIONER

## 2022-03-02 PROCEDURE — 99203 OFFICE O/P NEW LOW 30 MIN: CPT | Mod: TIER,S$GLB,, | Performed by: NURSE PRACTITIONER

## 2022-03-02 PROCEDURE — 81003 URINALYSIS AUTO W/O SCOPE: CPT | Mod: QW,TIER,S$GLB, | Performed by: NURSE PRACTITIONER

## 2022-03-02 PROCEDURE — 99203 PR OFFICE/OUTPT VISIT, NEW, LEVL III, 30-44 MIN: ICD-10-PCS | Mod: TIER,S$GLB,, | Performed by: NURSE PRACTITIONER

## 2022-03-02 NOTE — TELEPHONE ENCOUNTER
----- Message from Martita De La Cruz sent at 3/2/2022  3:10 PM CST -----  Regarding: Patient Advice          Name of Who is Calling:    Karlene Sweeney    Who Left The Message:     Karlene Sweeney      What is the request in detail:       Patient called requesting a call regarding to having blood drawn.   Please give a call back at your earliest convenience and further advise and further advise.      Thank you!      Reply by [facility]: No      Preferred Call Back :  [phone] (Y)

## 2022-03-02 NOTE — PROGRESS NOTES
"Subjective:       Patient ID: Karlene Sweeney is a 26 y.o. female.    Vitals:  height is 5' 2" (1.575 m) and weight is 57.2 kg (126 lb). Her temperature is 98 °F (36.7 °C). Her blood pressure is 128/79 and her pulse is 87. Her respiration is 18 and oxygen saturation is 98%.     Chief Complaint: Vaginal Discharge and Dysuria    Dysuria   This is a recurrent problem. The current episode started in the past 7 days (4 days now ). The problem occurs every urination. The quality of the pain is described as stabbing. The pain is at a severity of 5/10. The pain is mild. There has been no fever. Associated symptoms include a discharge (off and on ). Pertinent negatives include no chills, flank pain, frequency, nausea, possible pregnancy, urgency, vomiting, rash or withholding. She has tried antibiotics (2 weeks ago ) for the symptoms. The treatment provided mild relief. There is no history of kidney stones, recurrent UTIs, urinary stasis or a urological procedure.       Constitution: Negative for chills, sweating, fatigue and fever.   Cardiovascular: Negative for sob on exertion.   Gastrointestinal: Negative for nausea and vomiting.   Genitourinary: Positive for dysuria and vaginal discharge (off and on-- no malodor). Negative for frequency, urgency and flank pain.   Skin: Negative for rash.       Objective:      Physical Exam   Constitutional:  Non-toxic appearance. She does not appear ill. No distress.   Pulmonary/Chest: Effort normal.   Neurological: She is alert.   Skin: Skin is not diaphoretic.   Nursing note and vitals reviewed.        Results for orders placed or performed in visit on 03/02/22   POCT Urinalysis, Dipstick, Automated, W/O Scope   Result Value Ref Range    POC Blood, Urine Negative Negative    POC Bilirubin, Urine Negative Negative    POC Urobilinogen, Urine Normal 0.1 - 1.1    POC Ketones, Urine Negative Negative    POC Protein, Urine Negative Negative    POC Nitrates, Urine Negative Negative    POC " Glucose, Urine Negative Negative    pH, UA 5.5 5 - 8    POC Specific Gravity, Urine 1.025 1.003 - 1.029    POC Leukocytes, Urine Negative Negative   POCT urine pregnancy   Result Value Ref Range    POC Preg Test, Ur Positive (A) Negative     Acceptable Yes        Assessment:       1. Ectopic pregnancy, unspecified location, unspecified whether intrauterine pregnancy present    2. Positive pregnancy test    3. Dysuria          Plan:         Ectopic pregnancy, unspecified location, unspecified whether intrauterine pregnancy present    Positive pregnancy test    Dysuria  -     POCT Urinalysis, Dipstick, Automated, W/O Scope  -     POCT urine pregnancy         Review of recent medical records shows that patient had an ectopic pregnancy last week and was treated with methotrexate-- according to notes, Dr. Sheehan wanted patient to have serial weekly beta hcg tests until test was negative.  Patient called 's office while in room and requested Dr. Sheehan return her phone call.  Asked patient to call back to ask for appointment for [facility] walk in clinic on the 1st floor.  Patient called back and scheduled with Walk in clinic at 1145am tomorrow.  Stressed to patient not to miss appointment. ER precautions given to patient.

## 2022-03-03 ENCOUNTER — LAB VISIT (OUTPATIENT)
Dept: LAB | Facility: OTHER | Age: 27
End: 2022-03-03
Attending: OBSTETRICS & GYNECOLOGY
Payer: COMMERCIAL

## 2022-03-03 ENCOUNTER — OFFICE VISIT (OUTPATIENT)
Dept: OBSTETRICS AND GYNECOLOGY | Facility: CLINIC | Age: 27
End: 2022-03-03
Payer: COMMERCIAL

## 2022-03-03 ENCOUNTER — PATIENT MESSAGE (OUTPATIENT)
Dept: OBSTETRICS AND GYNECOLOGY | Facility: CLINIC | Age: 27
End: 2022-03-03

## 2022-03-03 VITALS
WEIGHT: 127.31 LBS | DIASTOLIC BLOOD PRESSURE: 76 MMHG | HEIGHT: 62 IN | SYSTOLIC BLOOD PRESSURE: 116 MMHG | BODY MASS INDEX: 23.43 KG/M2

## 2022-03-03 DIAGNOSIS — R10.2 PELVIC PAIN: Primary | ICD-10-CM

## 2022-03-03 DIAGNOSIS — O00.102 LEFT TUBAL PREGNANCY WITHOUT INTRAUTERINE PREGNANCY: ICD-10-CM

## 2022-03-03 DIAGNOSIS — Z30.09 ENCOUNTER FOR COUNSELING REGARDING CONTRACEPTION: ICD-10-CM

## 2022-03-03 LAB — HCG INTACT+B SERPL-ACNC: 36 MIU/ML

## 2022-03-03 PROCEDURE — 99214 PR OFFICE/OUTPT VISIT, EST, LEVL IV, 30-39 MIN: ICD-10-PCS | Mod: S$GLB,,, | Performed by: OBSTETRICS & GYNECOLOGY

## 2022-03-03 PROCEDURE — 87801 DETECT AGNT MULT DNA AMPLI: CPT | Performed by: OBSTETRICS & GYNECOLOGY

## 2022-03-03 PROCEDURE — 84702 CHORIONIC GONADOTROPIN TEST: CPT | Performed by: OBSTETRICS & GYNECOLOGY

## 2022-03-03 PROCEDURE — 36415 COLL VENOUS BLD VENIPUNCTURE: CPT | Performed by: OBSTETRICS & GYNECOLOGY

## 2022-03-03 PROCEDURE — 99213 OFFICE O/P EST LOW 20 MIN: CPT | Mod: PBBFAC,PN | Performed by: OBSTETRICS & GYNECOLOGY

## 2022-03-03 PROCEDURE — 99999 PR PBB SHADOW E&M-EST. PATIENT-LVL III: ICD-10-PCS | Mod: PBBFAC,,, | Performed by: OBSTETRICS & GYNECOLOGY

## 2022-03-03 PROCEDURE — 87481 CANDIDA DNA AMP PROBE: CPT | Mod: 59 | Performed by: OBSTETRICS & GYNECOLOGY

## 2022-03-03 PROCEDURE — 87591 N.GONORRHOEAE DNA AMP PROB: CPT | Mod: 59 | Performed by: OBSTETRICS & GYNECOLOGY

## 2022-03-03 PROCEDURE — 99214 OFFICE O/P EST MOD 30 MIN: CPT | Mod: S$GLB,,, | Performed by: OBSTETRICS & GYNECOLOGY

## 2022-03-03 PROCEDURE — 87491 CHLMYD TRACH DNA AMP PROBE: CPT | Performed by: OBSTETRICS & GYNECOLOGY

## 2022-03-03 PROCEDURE — 99999 PR PBB SHADOW E&M-EST. PATIENT-LVL III: CPT | Mod: PBBFAC,,, | Performed by: OBSTETRICS & GYNECOLOGY

## 2022-03-03 RX ORDER — ETONOGESTREL AND ETHINYL ESTRADIOL VAGINAL RING .015; .12 MG/D; MG/D
1 RING VAGINAL
Qty: 1 EACH | Refills: 11 | Status: SHIPPED | OUTPATIENT
Start: 2022-03-03 | End: 2023-03-03

## 2022-03-03 RX ORDER — NAPROXEN 500 MG/1
500 TABLET ORAL 2 TIMES DAILY WITH MEALS
Qty: 30 TABLET | Refills: 0 | Status: SHIPPED | OUTPATIENT
Start: 2022-03-03 | End: 2023-03-03

## 2022-03-03 NOTE — PROGRESS NOTES
Chief Complaint   Patient presents with    Follow-up     Pt states that she has  been having pelvic pain for roughly 4-5 days       HPI:   26 y.o.  here today for clear vaginal discharge for the past few days along with pelvic pain and discomfort. She is s/p 2 doses of MTX for left ectopic pregnancy, last dose given 22 with an appropriate decrease in hCG. She had recent labs drawn this morning. Was seen at  yesterday for pelvic pain and discharge and was told to follow up with OBGYN. A urinalysis was done which was negative for infection. She denies any vaginal bleeding for the past few weeks and does not believe she has had a true menstrual cycle since the MTX. She has been using condoms for contraception and desires to start the NuvaRing.      LMP Dates from Last 1 Encounters:   LMP: 2021     HCG (): 2728 --> (): 2411 --> ():  --> (): 1117 --> (): 229  TVUS (22): Normal uterus, EMS 7 mm, no intrauterine gestational sac. ROV wnl. Medial to LOV is a 2.4x1.6x1.5 cm heterogeneous but predominantly hyperechoic structure without internal vascularity, suspected ectopic. Trace free fluid within the pelvis    Lab Visit on 2022   Component Date Value Ref Range Status    HCG Quant 2022 36  See Text mIU/mL Final    Comment: Quantitative HCG Reference Ranges:  Males........................<5.0 mIU/mL  Non-Pregnant Females.........<5.0 mIU/mL  Pregnancy:  Weeks post-LMP...............Range (mIU/mL)  1-10  weeks.....................202-231,000  11-15 weeks..................22,536-234,990  16-22 weeks...................8,007-50,064  23-40 weeks...................2,453-96,986    NOTE:  This assay is not FDA approved for tumor screening,   diagnosis, or monitoring.     Office Visit on 2022   Component Date Value Ref Range Status    POC Blood, Urine 2022 Negative  Negative Final    POC Bilirubin, Urine 2022 Negative  Negative Final    POC Urobilinogen,  Urine 03/02/2022 Normal  0.1 - 1.1 Final    POC Ketones, Urine 03/02/2022 Negative  Negative Final    POC Protein, Urine 03/02/2022 Negative  Negative Final    POC Nitrates, Urine 03/02/2022 Negative  Negative Final    POC Glucose, Urine 03/02/2022 Negative  Negative Final    pH, UA 03/02/2022 5.5  5 - 8 Final    POC Specific Gravity, Urine 03/02/2022 1.025  1.003 - 1.029 Final    POC Leukocytes, Urine 03/02/2022 Negative  Negative Final    POC Preg Test, Ur 03/02/2022 Positive (A) Negative Final     Acceptable 03/02/2022 Yes   Final   Lab Visit on 02/21/2022   Component Date Value Ref Range Status    HCG Quant 02/21/2022 229  See Text mIU/mL Final    Comment: Quantitative HCG Reference Ranges:  Males........................<5.0 mIU/mL  Non-Pregnant Females.........<5.0 mIU/mL  Pregnancy:  Weeks post-LMP...............Range (mIU/mL)  1-10  weeks.....................202-231,000  11-15 weeks..................22,536-234,990  16-22 weeks...................8,007-50,064  23-40 weeks...................1,600-49,413    NOTE:  This assay is not FDA approved for tumor screening,   diagnosis, or monitoring.     Admission on 02/13/2022, Discharged on 02/13/2022   Component Date Value Ref Range Status    POC Preg Test, Ur 02/13/2022 Positive (A) Negative Final     Acceptable 02/13/2022 Yes   Final    Specimen UA 02/13/2022 Urine, Clean Catch   Final    Color, UA 02/13/2022 Yellow  Yellow, Straw, Jenn Final    Appearance, UA 02/13/2022 Clear  Clear Final    pH, UA 02/13/2022 7.0  5.0 - 8.0 Final    Specific [place], UA 02/13/2022 1.015  1.005 - 1.030 Final    Protein, UA 02/13/2022 2+ (A) Negative Final    Comment: Recommend a 24 hour urine protein or a urine   protein/creatinine ratio if globulin induced proteinuria is  clinically suspected.      Glucose, UA 02/13/2022 Negative  Negative Final    Ketones, UA 02/13/2022 Trace (A) Negative Final    Bilirubin (UA) 02/13/2022  Negative  Negative Final    Occult Blood UA 02/13/2022 3+ (A) Negative Final    Nitrite, UA 02/13/2022 Positive (A) Negative Final    Urobilinogen, UA 02/13/2022 1.0  <2.0 EU/dL Final    Leukocytes, UA 02/13/2022 Trace (A) Negative Final    WBC 02/13/2022 3.87 (A) 3.90 - 12.70 K/uL Final    RBC 02/13/2022 3.82 (A) 4.00 - 5.40 M/uL Final    Hemoglobin 02/13/2022 11.5 (A) 12.0 - 16.0 g/dL Final    Hematocrit 02/13/2022 33.4 (A) 37.0 - 48.5 % Final    MCV 02/13/2022 87  82 - 98 fL Final    MCH 02/13/2022 30.1  27.0 - 31.0 pg Final    MCHC 02/13/2022 34.4  32.0 - 36.0 g/dL Final    RDW 02/13/2022 11.9  11.5 - 14.5 % Final    Platelets 02/13/2022 313  150 - 450 K/uL Final    MPV 02/13/2022 8.3 (A) 9.2 - 12.9 fL Final    Immature Granulocytes 02/13/2022 0.3  0.0 - 0.5 % Final    Gran # (ANC) 02/13/2022 1.4 (A) 1.8 - 7.7 K/uL Final    Immature Grans (Abs) 02/13/2022 0.01  0.00 - 0.04 K/uL Final    Comment: Mild elevation in immature granulocytes is non specific and   can be seen in a variety of conditions including stress response,   acute inflammation, trauma and pregnancy. Correlation with other   laboratory and clinical findings is essential.      Lymph # 02/13/2022 1.6  1.0 - 4.8 K/uL Final    Mono # 02/13/2022 0.7  0.3 - 1.0 K/uL Final    Eos # 02/13/2022 0.2  0.0 - 0.5 K/uL Final    Baso # 02/13/2022 0.03  0.00 - 0.20 K/uL Final    nRBC 02/13/2022 0  0 /100 WBC Final    Gran % 02/13/2022 36.9 (A) 38.0 - 73.0 % Final    Lymph % 02/13/2022 40.1  18.0 - 48.0 % Final    Mono % 02/13/2022 17.8 (A) 4.0 - 15.0 % Final    Eosinophil % 02/13/2022 4.1  0.0 - 8.0 % Final    Basophil % 02/13/2022 0.8  0.0 - 1.9 % Final    Differential Method 02/13/2022 Automated   Final    Sodium 02/13/2022 139  136 - 145 mmol/L Final    Potassium 02/13/2022 3.9  3.5 - 5.1 mmol/L Final    Specimen slightly hemolyzed    Chloride 02/13/2022 106  95 - 110 mmol/L Final    CO2 02/13/2022 23  23 - 29 mmol/L Final     Glucose 02/13/2022 79  70 - 110 mg/dL Final    BUN 02/13/2022 9  6 - 20 mg/dL Final    Creatinine 02/13/2022 0.9  0.5 - 1.4 mg/dL Final    Calcium 02/13/2022 9.4  8.7 - 10.5 mg/dL Final    Total Protein 02/13/2022 7.3  6.0 - 8.4 g/dL Final    Albumin 02/13/2022 4.0  3.5 - 5.2 g/dL Final    Total Bilirubin 02/13/2022 0.3  0.1 - 1.0 mg/dL Final    Comment: For infants and newborns, interpretation of results should be based  on gestational age, weight and in agreement with clinical  observations.    Premature Infant recommended reference ranges:  Up to 24 hours.............<8.0 mg/dL  Up to 48 hours............<12.0 mg/dL  3-5 days..................<15.0 mg/dL  6-29 days.................<15.0 mg/dL      Alkaline Phosphatase 02/13/2022 66  55 - 135 U/L Final    AST 02/13/2022 42 (A) 10 - 40 U/L Final    ALT 02/13/2022 40  10 - 44 U/L Final    Anion Gap 02/13/2022 10  8 - 16 mmol/L Final    eGFR if African American 02/13/2022 >60  >60 mL/min/1.73 m^2 Final    eGFR if non African American 02/13/2022 >60  >60 mL/min/1.73 m^2 Final    Comment: Calculation used to obtain the estimated glomerular filtration  rate (eGFR) is the CKD-EPI equation.       HCG Quant 02/13/2022 1117  See Text mIU/mL Final    Comment: Quantitative HCG Reference Ranges:  Males........................<5.0 mIU/mL  Non-Pregnant Females.........<5.0 mIU/mL  Pregnancy:  Weeks post-LMP...............Range (mIU/mL)  1-10  weeks.....................202-231,000  11-15 weeks..................22,536-234,990  16-22 weeks...................8,007-50,064  23-40 weeks...................1,600-49,413    NOTE:  This assay is not FDA approved for tumor screening,   diagnosis, or monitoring.      RBC, UA 02/13/2022 70 (A) 0 - 4 /hpf Final    WBC, UA 02/13/2022 15 (A) 0 - 5 /hpf Final    WBC Clumps, UA 02/13/2022 Occasional (A) None-Rare Final    Bacteria 02/13/2022 Occasional  None-Occ /hpf Final    Yeast, UA 02/13/2022 None  None Final    Squam  [place], UA 02/13/2022 2  /hpf Final    Non-Squam Epith 02/13/2022 0  <1/hpf /hpf Final    Hyaline Casts, UA 02/13/2022 2 (A) 0-1/lpf /lpf Final    Microscopic Comment 02/13/2022 SEE COMMENT   Final    Comment: Other formed elements not mentioned in the report are not   present in the microscopic examination.       Lipase 02/13/2022 67 (A) 4 - 60 U/L Final    Urine Culture, Routine 02/13/2022 No growth   Final   Clinical Support on 02/09/2022   Component Date Value Ref Range Status    Urine Culture, Routine 02/09/2022 No significant growth   Final   Lab Visit on 02/08/2022   Component Date Value Ref Range Status    HCG Quant 02/08/2022 2009  See Text mIU/mL Final    Comment: Quantitative Total HCG Reference Ranges:  Males.....................<5.0 mIU/mL  Non-Pregnant Females:  18Y-41Y pre-menopausal....<2.4 mIU/mL  42Y-55Y chris-menopausal...<=4.9 mIU/mL  55Y post-menopausal.......<=7.6 mIU/mL  Pregnancy:  Weeks post-LMP..........Range (mIU/mL)  1-10  weeks................202-231,000  11-15 weeks.............22,536-234,990  16-22 weeks...............8,007-50,064  23-40 weeks...............1,600-49,413    NOTE:  This assay is not FDA approved for tumor screening,   diagnosis, or monitoring.     Lab Visit on 02/05/2022   Component Date Value Ref Range Status    HCG Quant 02/05/2022 2411  See Text mIU/mL Final    Comment: Quantitative Total HCG Reference Ranges:  Males.....................<5.0 mIU/mL  Non-Pregnant Females:  18Y-41Y pre-menopausal....<2.4 mIU/mL  42Y-55Y chris-menopausal...<=4.9 mIU/mL  55Y post-menopausal.......<=7.6 mIU/mL  Pregnancy:  Weeks post-LMP..........Range (mIU/mL)  1-10  weeks................202-231,000  11-15 weeks.............22,536-234,990  16-22 weeks...............8,007-50,064  23-40 weeks...............1,600-49,413    NOTE:  This assay is not FDA approved for tumor screening,   diagnosis, or monitoring.     Admission on 02/04/2022, Discharged on 02/04/2022   Component Date Value  Ref Range Status    WBC 02/04/2022 4.74  3.90 - 12.70 K/uL Final    RBC 02/04/2022 4.23  4.00 - 5.40 M/uL Final    Hemoglobin 02/04/2022 12.4  12.0 - 16.0 g/dL Final    Hematocrit 02/04/2022 37.9  37.0 - 48.5 % Final    MCV 02/04/2022 90  82 - 98 fL Final    MCH 02/04/2022 29.3  27.0 - 31.0 pg Final    MCHC 02/04/2022 32.7  32.0 - 36.0 g/dL Final    RDW 02/04/2022 11.7  11.5 - 14.5 % Final    Platelets 02/04/2022 295  150 - 450 K/uL Final    MPV 02/04/2022 8.6 (A) 9.2 - 12.9 fL Final    Immature Granulocytes 02/04/2022 0.2  0.0 - 0.5 % Final    Gran # (ANC) 02/04/2022 2.2  1.8 - 7.7 K/uL Final    Immature Grans (Abs) 02/04/2022 0.01  0.00 - 0.04 K/uL Final    Comment: Mild elevation in immature granulocytes is non specific and   can be seen in a variety of conditions including stress response,   acute inflammation, trauma and pregnancy. Correlation with other   laboratory and clinical findings is essential.      Lymph # 02/04/2022 1.8  1.0 - 4.8 K/uL Final    Mono # 02/04/2022 0.5  0.3 - 1.0 K/uL Final    Eos # 02/04/2022 0.2  0.0 - 0.5 K/uL Final    Baso # 02/04/2022 0.03  0.00 - 0.20 K/uL Final    nRBC 02/04/2022 0  0 /100 WBC Final    Gran % 02/04/2022 47.2  38.0 - 73.0 % Final    Lymph % 02/04/2022 36.9  18.0 - 48.0 % Final    Mono % 02/04/2022 10.5  4.0 - 15.0 % Final    Eosinophil % 02/04/2022 4.6  0.0 - 8.0 % Final    Basophil % 02/04/2022 0.6  0.0 - 1.9 % Final    Differential Method 02/04/2022 Automated   Final    Sodium 02/04/2022 138  136 - 145 mmol/L Final    Potassium 02/04/2022 4.2  3.5 - 5.1 mmol/L Final    Specimen slightly hemolyzed    Chloride 02/04/2022 106  95 - 110 mmol/L Final    CO2 02/04/2022 23  23 - 29 mmol/L Final    Glucose 02/04/2022 97  70 - 110 mg/dL Final    BUN 02/04/2022 10  6 - 20 mg/dL Final    Creatinine 02/04/2022 0.8  0.5 - 1.4 mg/dL Final    Calcium 02/04/2022 9.3  8.7 - 10.5 mg/dL Final    Total Protein 02/04/2022 7.2  6.0 - 8.4 g/dL Final     Albumin 02/04/2022 3.9  3.5 - 5.2 g/dL Final    Total Bilirubin 02/04/2022 0.2  0.1 - 1.0 mg/dL Final    Comment: For infants and newborns, interpretation of results should be based  on gestational age, weight and in agreement with clinical  observations.    Premature Infant recommended reference ranges:  Up to 24 hours.............<8.0 mg/dL  Up to 48 hours............<12.0 mg/dL  3-5 days..................<15.0 mg/dL  6-29 days.................<15.0 mg/dL      Alkaline Phosphatase 02/04/2022 68  55 - 135 U/L Final    AST 02/04/2022 36  10 - 40 U/L Final    ALT 02/04/2022 28  10 - 44 U/L Final    Anion Gap 02/04/2022 9  8 - 16 mmol/L Final    eGFR if African American 02/04/2022 >60  >60 mL/min/1.73 m^2 Final    eGFR if non African American 02/04/2022 >60  >60 mL/min/1.73 m^2 Final    Comment: Calculation used to obtain the estimated glomerular filtration  rate (eGFR) is the CKD-EPI equation.       HCG Quant 02/04/2022 2305  See Text mIU/mL Final    Comment: Quantitative HCG Reference Ranges:  Males........................<5.0 mIU/mL  Non-Pregnant Females.........<5.0 mIU/mL  Pregnancy:  Weeks post-LMP...............Range (mIU/mL)  1-10  weeks.....................202-231,000  11-15 weeks..................22,536-234,990  16-22 weeks...................8,007-50,064  23-40 weeks...................1,600-49,527    NOTE:  This assay is not FDA approved for tumor screening,   diagnosis, or monitoring.     Lab Visit on 02/02/2022   Component Date Value Ref Range Status    WBC 02/02/2022 4.09  3.90 - 12.70 K/uL Final    RBC 02/02/2022 4.49  4.00 - 5.40 M/uL Final    Hemoglobin 02/02/2022 13.4  12.0 - 16.0 g/dL Final    Hematocrit 02/02/2022 39.6  37.0 - 48.5 % Final    MCV 02/02/2022 88  82 - 98 fL Final    MCH 02/02/2022 29.8  27.0 - 31.0 pg Final    MCHC 02/02/2022 33.8  32.0 - 36.0 g/dL Final    RDW 02/02/2022 11.6  11.5 - 14.5 % Final    Platelets 02/02/2022 338  150 - 450 K/uL Final    MPV 02/02/2022  8.3 (A) 9.2 - 12.9 fL Final    Immature Granulocytes 02/02/2022 0.2  0.0 - 0.5 % Final    Gran # (ANC) 02/02/2022 1.4 (A) 1.8 - 7.7 K/uL Final    Immature Grans (Abs) 02/02/2022 0.01  0.00 - 0.04 K/uL Final    Comment: Mild elevation in immature granulocytes is non specific and   can be seen in a variety of conditions including stress response,   acute inflammation, trauma and pregnancy. Correlation with other   laboratory and clinical findings is essential.      Lymph # 02/02/2022 1.9  1.0 - 4.8 K/uL Final    Mono # 02/02/2022 0.5  0.3 - 1.0 K/uL Final    Eos # 02/02/2022 0.2  0.0 - 0.5 K/uL Final    Baso # 02/02/2022 0.03  0.00 - 0.20 K/uL Final    nRBC 02/02/2022 0  0 /100 WBC Final    Gran % 02/02/2022 34.8 (A) 38.0 - 73.0 % Final    Lymph % 02/02/2022 46.9  18.0 - 48.0 % Final    Mono % 02/02/2022 13.0  4.0 - 15.0 % Final    Eosinophil % 02/02/2022 4.4  0.0 - 8.0 % Final    Basophil % 02/02/2022 0.7  0.0 - 1.9 % Final    Differential Method 02/02/2022 Automated   Final    Sodium 02/02/2022 138  136 - 145 mmol/L Final    Potassium 02/02/2022 4.1  3.5 - 5.1 mmol/L Final    Chloride 02/02/2022 106  95 - 110 mmol/L Final    CO2 02/02/2022 25  23 - 29 mmol/L Final    Glucose 02/02/2022 81  70 - 110 mg/dL Final    BUN 02/02/2022 7  6 - 20 mg/dL Final    Creatinine 02/02/2022 0.8  0.5 - 1.4 mg/dL Final    Calcium 02/02/2022 9.6  8.7 - 10.5 mg/dL Final    Total Protein 02/02/2022 7.4  6.0 - 8.4 g/dL Final    Albumin 02/02/2022 4.1  3.5 - 5.2 g/dL Final    Total Bilirubin 02/02/2022 0.4  0.1 - 1.0 mg/dL Final    Comment: For infants and newborns, interpretation of results should be based  on gestational age, weight and in agreement with clinical  observations.    Premature Infant recommended reference ranges:  Up to 24 hours.............<8.0 mg/dL  Up to 48 hours............<12.0 mg/dL  3-5 days..................<15.0 mg/dL  6-29 days.................<15.0 mg/dL      Alkaline Phosphatase  02/02/2022 70  55 - 135 U/L Final    AST 02/02/2022 14  10 - 40 U/L Final    ALT 02/02/2022 9 (A) 10 - 44 U/L Final    Anion Gap 02/02/2022 7 (A) 8 - 16 mmol/L Final    eGFR if African American 02/02/2022 >60  >60 mL/min/1.73 m^2 Final    eGFR if non African American 02/02/2022 >60  >60 mL/min/1.73 m^2 Final    Comment: Calculation used to obtain the estimated glomerular filtration  rate (eGFR) is the CKD-EPI equation.       HCG Quant 02/02/2022 2728  See Text mIU/mL Final    Comment: Quantitative HCG Reference Ranges:  Males........................<5.0 mIU/mL  Non-Pregnant Females.........<5.0 mIU/mL  Pregnancy:  Weeks post-LMP...............Range (mIU/mL)  1-10  weeks.....................202-231,000  11-15 weeks..................22,536-234,990  16-22 weeks...................8,007-50,064  23-40 weeks...................1,600-49,413    NOTE:  This assay is not FDA approved for tumor screening,   diagnosis, or monitoring.     Lab Visit on 01/31/2022   Component Date Value Ref Range Status    HCG Quant 01/31/2022 3131  See Text mIU/mL Final    Comment: Quantitative Total HCG Reference Ranges:  Males.....................<5.0 mIU/mL  Non-Pregnant Females:  18Y-41Y pre-menopausal....<2.4 mIU/mL  42Y-55Y chris-menopausal...<=4.9 mIU/mL  55Y post-menopausal.......<=7.6 mIU/mL  Pregnancy:  Weeks post-LMP..........Range (mIU/mL)  1-10  weeks................202-231,000  11-15 weeks.............22,536-234,990  16-22 weeks...............8,007-50,064  23-40 weeks...............1,600-49,413    NOTE:  This assay is not FDA approved for tumor screening,   diagnosis, or monitoring.     There may be more visits with results that are not included.        Past Medical History:   Diagnosis Date    Abnormal Pap smear of cervix 2014     Past Surgical History:   Procedure Laterality Date    ELBOW SURGERY Left 2018    SPLENECTOMY, PARTIAL N/A 2018       Current Outpatient Medications:     etonogestreL-ethinyl estradioL (NUVARING)  0.12-0.015 mg/24 hr vaginal ring, Place 1 each vaginally every 21 days. Insert one (1) ring vaginally and leave in place for three (3) weeks, then remove for one (1) week., Disp: 1 each, Rfl: 11    HYDROcodone-acetaminophen (NORCO) 7.5-325 mg per tablet, Take 1 tablet by mouth every 6 (six) hours as needed for Pain. (Patient not taking: Reported on 3/2/2022), Disp: 12 tablet, Rfl: 0    naproxen (NAPROSYN) 500 MG tablet, Take 1 tablet (500 mg total) by mouth 2 (two) times daily with meals., Disp: 30 tablet, Rfl: 0    ondansetron (ZOFRAN) 4 MG tablet, Take 1 tablet (4 mg total) by mouth every 6 (six) hours as needed for Nausea. (Patient not taking: Reported on 3/2/2022), Disp: 30 tablet, Rfl: 1    prenatal vit-iron fum-folic ac 27 mg iron- 0.8 mg Tab, Take 1 tablet by mouth once daily. (Patient not taking: Reported on 3/2/2022), Disp: 30 tablet, Rfl: 11  Review of patient's allergies indicates:  No Known Allergies  OB History    Para Term  AB Living   4       3 0   SAB IAB Ectopic Multiple Live Births   3              # Outcome Date GA Lbr John/2nd Weight Sex Delivery Anes PTL Lv   4             3 2020           2 2019           1 2018             Social History     Tobacco Use    Smoking status: Never Smoker    Smokeless tobacco: Never Used   Substance Use Topics    Alcohol use: Yes    Drug use: Yes     Types: Marijuana     Family History   Problem Relation Age of Onset    Breast cancer Neg Hx     Colon cancer Neg Hx     Ovarian cancer Neg Hx        Review of Systems   Negative except as in HPI      Physical Exam   Vitals:    22 1047   BP: 116/76     Body mass index is 23.29 kg/m².    Physical Exam  Constitutional:       General: She is not in acute distress.     Appearance: Normal appearance.   Genitourinary:      Vulva and bladder normal.      Vaginal discharge present.      No vaginal bleeding.        Right Adnexa: not tender, not full and no mass present.     Left  Adnexa: not tender, not full and no mass present.     No cervical motion tenderness or lesion.      Cervical exam comments: Patient with mild cervical tenderness and discomfort with vaginal exam. No rebound tenderness or guarding. Had vaginal cleansing beads inside which I removed, clumpy white discharge present but hard to tell if pathologic or from the beads.      Uterus is not tender.      No uterine mass detected.     Bladder is not tender.       Pelvic exam was performed with patient in the lithotomy position.   HENT:      Head: Normocephalic and atraumatic.   Pulmonary:      Effort: Pulmonary effort is normal.   Abdominal:      General: Bowel sounds are normal. There is no distension.      Palpations: Abdomen is soft.      Tenderness: There is no abdominal tenderness. There is no guarding or rebound.   Musculoskeletal:         General: Normal range of motion.      Cervical back: Normal range of motion.   Neurological:      General: No focal deficit present.      Mental Status: She is alert and oriented to person, place, and time.   Skin:     General: Skin is warm and dry.   Psychiatric:         Mood and Affect: Mood normal.         Behavior: Behavior normal.         Thought Content: Thought content normal.   Exam conducted with a chaperone present.        Labs reviewed: hCG x3, TVUSx2, CBC, CMP    ASSESSMENT:   Patient Active Problem List   Diagnosis    Ectopic pregnancy    Pelvic pain       PLAN:  Problem List Items Addressed This Visit        GI    Pelvic pain - Primary    Current Assessment & Plan     S/p MTX x2 for left ectopic, hCG today down to 36 with appropriate decrease since treatment. Patient with vaginal discharge and irritation. Affirm and GC/CT collected. Will send Rx for naproxen for discomfort. Okay to initiate NuvaRing for contraception as beta is decreasing, but will continue to measure until it is zero. Appropriate use reviewed. Patient advised to contact me if cost is prohibitive as she  is self-pay.   Will repeat pelvic US in 1 month to assess complex fluid collection in the left adnexa, likely resolving ectopic. Pelvic exam with mild discomfort but no acute abdomen. Precautions reviewed.            Relevant Medications    naproxen (NAPROSYN) 500 MG tablet    Other Relevant Orders    US Pelvis Comp with Transvag NON-OB (xpd       Obstetric    Ectopic pregnancy      Other Visit Diagnoses     Encounter for counseling regarding contraception        Relevant Medications    etonogestreL-ethinyl estradioL (NUVARING) 0.12-0.015 mg/24 hr vaginal ring    Other Relevant Orders    Vaginosis Screen by DNA Probe    C. trachomatis/N. gonorrhoeae by AMP DNA [facility]; Cervicovaginal (Completed)           Total time spent on this encounter was 20 minutes.  This includes preparing to see the patient;  obtaining/reviewing separately obtained history;  performing a medical exam and/or evaluation;   counseling/educating the patient;  ordering medications, tests, of procedures;   referring/communicating with other health care professionals;  EMR documentation;  interpreting/communicating results to the patient;  and/or care coordination.    Follow up if symptoms worsen or fail to improve, for results.       Jodi Sheehan MD  Department of Obstetrics & Gynecology  [facility]

## 2022-03-03 NOTE — ASSESSMENT & PLAN NOTE
S/p MTX x2 for left ectopic, hCG today down to 36 with appropriate decrease since treatment. Patient with vaginal discharge and irritation. Affirm and GC/CT collected. Will send Rx for naproxen for discomfort. Okay to initiate NuvaRing for contraception as beta is decreasing, but will continue to measure until it is zero. Appropriate use reviewed. Patient advised to contact me if cost is prohibitive as she is self-pay.   Will repeat pelvic US in 1 month to assess complex fluid collection in the left adnexa, likely resolving ectopic. Pelvic exam with mild discomfort but no acute abdomen. Precautions reviewed.

## 2022-03-04 LAB
BACTERIAL VAGINOSIS DNA: POSITIVE
C TRACH DNA SPEC QL NAA+PROBE: NOT DETECTED
CANDIDA GLABRATA DNA: NEGATIVE
CANDIDA KRUSEI DNA: NEGATIVE
CANDIDA RRNA VAG QL PROBE: POSITIVE
N GONORRHOEA DNA SPEC QL NAA+PROBE: NOT DETECTED
T VAGINALIS RRNA GENITAL QL PROBE: NEGATIVE

## 2022-03-07 DIAGNOSIS — N76.0 BACTERIAL VAGINOSIS: Primary | ICD-10-CM

## 2022-03-07 DIAGNOSIS — B96.89 BACTERIAL VAGINOSIS: Primary | ICD-10-CM

## 2022-03-07 DIAGNOSIS — B37.31 YEAST VAGINITIS: ICD-10-CM

## 2022-03-07 RX ORDER — FLUCONAZOLE 150 MG/1
150 TABLET ORAL ONCE
Qty: 1 TABLET | Refills: 0 | Status: SHIPPED | OUTPATIENT
Start: 2022-03-07 | End: 2022-03-07

## 2022-03-07 RX ORDER — METRONIDAZOLE 500 MG/1
500 TABLET ORAL EVERY 12 HOURS
Qty: 14 TABLET | Refills: 0 | Status: SHIPPED | OUTPATIENT
Start: 2022-03-07 | End: 2022-03-14

## 2022-03-11 ENCOUNTER — TELEPHONE (OUTPATIENT)
Dept: OBSTETRICS AND GYNECOLOGY | Facility: CLINIC | Age: 27
End: 2022-03-11
Payer: COMMERCIAL

## 2022-03-11 NOTE — TELEPHONE ENCOUNTER
----- Message from Jodi Sheehan MD sent at 3/10/2022  7:00 PM CST -----  Regarding: beta  Can you please follow up with Karlene and have her get one more set of beta hCG labs drawn? She was supposed to come today 3/10 but didn't.

## 2022-03-17 ENCOUNTER — LAB VISIT (OUTPATIENT)
Dept: LAB | Facility: OTHER | Age: 27
End: 2022-03-17
Attending: OBSTETRICS & GYNECOLOGY
Payer: COMMERCIAL

## 2022-03-17 DIAGNOSIS — O00.102 LEFT TUBAL PREGNANCY WITHOUT INTRAUTERINE PREGNANCY: ICD-10-CM

## 2022-03-17 DIAGNOSIS — O00.90 ECTOPIC PREGNANCY, UNSPECIFIED LOCATION, UNSPECIFIED WHETHER INTRAUTERINE PREGNANCY PRESENT: ICD-10-CM

## 2022-03-17 LAB
HCG INTACT+B SERPL-ACNC: <1.2 MIU/ML

## 2022-03-17 PROCEDURE — 84702 CHORIONIC GONADOTROPIN TEST: CPT | Performed by: OBSTETRICS & GYNECOLOGY

## 2022-03-17 PROCEDURE — 36415 COLL VENOUS BLD VENIPUNCTURE: CPT | Performed by: OBSTETRICS & GYNECOLOGY

## 2022-03-28 ENCOUNTER — PATIENT MESSAGE (OUTPATIENT)
Dept: OBSTETRICS AND GYNECOLOGY | Facility: CLINIC | Age: 27
End: 2022-03-28
Payer: COMMERCIAL

## 2022-06-03 DIAGNOSIS — Z30.09 ENCOUNTER FOR COUNSELING REGARDING CONTRACEPTION: ICD-10-CM

## 2022-06-03 RX ORDER — ETONOGESTREL AND ETHINYL ESTRADIOL VAGINAL RING .015; .12 MG/D; MG/D
1 RING VAGINAL
Qty: 1 EACH | Refills: 11 | Status: CANCELLED | OUTPATIENT
Start: 2022-06-03 | End: 2023-06-03
